# Patient Record
Sex: FEMALE | Race: WHITE | Employment: OTHER | URBAN - METROPOLITAN AREA
[De-identification: names, ages, dates, MRNs, and addresses within clinical notes are randomized per-mention and may not be internally consistent; named-entity substitution may affect disease eponyms.]

---

## 2022-01-12 ENCOUNTER — OFFICE VISIT (OUTPATIENT)
Dept: OBGYN CLINIC | Age: 52
End: 2022-01-12
Payer: COMMERCIAL

## 2022-01-12 VITALS
WEIGHT: 161 LBS | SYSTOLIC BLOOD PRESSURE: 120 MMHG | HEIGHT: 69 IN | BODY MASS INDEX: 23.85 KG/M2 | DIASTOLIC BLOOD PRESSURE: 72 MMHG

## 2022-01-12 DIAGNOSIS — R87.610 ASCUS OF CERVIX WITH NEGATIVE HIGH RISK HPV: Primary | ICD-10-CM

## 2022-01-12 PROCEDURE — 99202 OFFICE O/P NEW SF 15 MIN: CPT | Performed by: OBSTETRICS & GYNECOLOGY

## 2023-03-13 ENCOUNTER — OFFICE VISIT (OUTPATIENT)
Dept: OBGYN CLINIC | Age: 53
End: 2023-03-13
Payer: COMMERCIAL

## 2023-03-13 VITALS
BODY MASS INDEX: 23.55 KG/M2 | WEIGHT: 159 LBS | HEIGHT: 69 IN | DIASTOLIC BLOOD PRESSURE: 78 MMHG | SYSTOLIC BLOOD PRESSURE: 138 MMHG

## 2023-03-13 DIAGNOSIS — Z01.419 ENCOUNTER FOR WELL WOMAN EXAM WITH ROUTINE GYNECOLOGICAL EXAM: Primary | ICD-10-CM

## 2023-03-13 DIAGNOSIS — Z12.31 SCREENING MAMMOGRAM FOR BREAST CANCER: ICD-10-CM

## 2023-03-13 PROCEDURE — 99396 PREV VISIT EST AGE 40-64: CPT | Performed by: OBSTETRICS & GYNECOLOGY

## 2023-03-13 RX ORDER — VITAMIN B COMPLEX
CAPSULE ORAL DAILY
COMMUNITY

## 2023-03-13 RX ORDER — MULTIVIT-MIN/IRON/FOLIC ACID/K 18-600-40
CAPSULE ORAL
COMMUNITY

## 2023-03-13 SDOH — ECONOMIC STABILITY: INCOME INSECURITY: HOW HARD IS IT FOR YOU TO PAY FOR THE VERY BASICS LIKE FOOD, HOUSING, MEDICAL CARE, AND HEATING?: NOT HARD AT ALL

## 2023-03-13 SDOH — ECONOMIC STABILITY: FOOD INSECURITY: WITHIN THE PAST 12 MONTHS, YOU WORRIED THAT YOUR FOOD WOULD RUN OUT BEFORE YOU GOT MONEY TO BUY MORE.: NEVER TRUE

## 2023-03-13 SDOH — ECONOMIC STABILITY: HOUSING INSECURITY
IN THE LAST 12 MONTHS, WAS THERE A TIME WHEN YOU DID NOT HAVE A STEADY PLACE TO SLEEP OR SLEPT IN A SHELTER (INCLUDING NOW)?: NO

## 2023-03-13 SDOH — ECONOMIC STABILITY: FOOD INSECURITY: WITHIN THE PAST 12 MONTHS, THE FOOD YOU BOUGHT JUST DIDN'T LAST AND YOU DIDN'T HAVE MONEY TO GET MORE.: NEVER TRUE

## 2023-03-13 ASSESSMENT — PATIENT HEALTH QUESTIONNAIRE - PHQ9
2. FEELING DOWN, DEPRESSED OR HOPELESS: 0
1. LITTLE INTEREST OR PLEASURE IN DOING THINGS: 0
SUM OF ALL RESPONSES TO PHQ QUESTIONS 1-9: 0
SUM OF ALL RESPONSES TO PHQ QUESTIONS 1-9: 0
SUM OF ALL RESPONSES TO PHQ9 QUESTIONS 1 & 2: 0
SUM OF ALL RESPONSES TO PHQ QUESTIONS 1-9: 0
SUM OF ALL RESPONSES TO PHQ QUESTIONS 1-9: 0

## 2023-03-13 ASSESSMENT — ENCOUNTER SYMPTOMS
BLOOD IN STOOL: 0
DIARRHEA: 0
RESPIRATORY NEGATIVE: 1
ANAL BLEEDING: 0
ABDOMINAL PAIN: 0
ABDOMINAL DISTENTION: 0
NAUSEA: 0
VOMITING: 0
CONSTIPATION: 0
RECTAL PAIN: 0
ALLERGIC/IMMUNOLOGIC NEGATIVE: 1
EYES NEGATIVE: 1

## 2023-03-13 ASSESSMENT — VISUAL ACUITY: OU: 1

## 2023-03-13 NOTE — PROGRESS NOTES
The patient was asked if she would like a chaperone present for her intimate exam. She  Declined the chaperone.  Garrick Mesa CMA ([address] [place] [address])

## 2023-03-13 NOTE — PROGRESS NOTES
Subjective:      Patient ID: Tanya Talley is a 47 y.o. female    Annual exam. Reviewed medical, surgical, social and family history. Also reviewed current medications and allergies. No PMB. No GYN complaints. Pap deferred ( ASCUS/neg HPV 2022; next pap 2025 ). Screening mammogram ordered. Monthly SBE encouraged. Screening colonoscopy recommended per routine. F/U annual exam or prn. Vitals:  /78   Ht 5' 9\" (1.753 m)   Wt 159 lb (72.1 kg)   BMI 23.48 kg/m²   Past Medical History:   Diagnosis Date    Abnormal Pap smear of cervix 11/18/2021    ASCUS     Hernia, inguinal, left      Past Surgical History:   Procedure Laterality Date    FOOT SURGERY      B/L     Allergies:  Sulfa antibiotics  Current Outpatient Medications   Medication Sig Dispense Refill    b complex vitamins capsule Take by mouth daily      Cholecalciferol (VITAMIN D) 50 MCG (2000 UT) CAPS capsule Take by mouth      Misc Natural Products (OSTEO BI-FLEX/5-LOXIN ADVANCED PO) Take by mouth       No current facility-administered medications for this visit. Social History     Socioeconomic History    Marital status:      Spouse name: Not on file    Number of children: Not on file    Years of education: Not on file    Highest education level: Not on file   Occupational History    Not on file   Tobacco Use    Smoking status: Former    Smokeless tobacco: Never   Substance and Sexual Activity    Alcohol use: Yes    Drug use: Not on file    Sexual activity: Yes     Partners: Male   Other Topics Concern    Not on file   Social History Narrative    Not on file     Social Determinants of Health     Financial Resource Strain: Low Risk     Difficulty of Paying Living Expenses: Not hard at all   Food Insecurity: No Food Insecurity    Worried About Running Out of Food in the Last Year: Never true    [address] in the Last Year: Never true   Transportation Needs: Unknown    Lack of Transportation (Medical):  Not on file    Lack of Transportation (Non-Medical): No   Physical Activity: Not on file   Stress: Not on file   Social Connections: Not on file   Intimate Partner Violence: Not on file   Housing Stability: Unknown    Unable to Pay for Housing in the Last Year: Not on file    Number of Places Lived in the Last Year: Not on file    Unstable Housing in the Last Year: No     Family History   Problem Relation Age of Onset    Parkinson's Disease Father 68    Breast Cancer Neg Hx     Cancer Neg Hx     Colon Cancer Neg Hx     Diabetes Neg Hx     Eclampsia Neg Hx     Hypertension Neg Hx     Ovarian Cancer Neg Hx      Labor Neg Hx     Spont Abortions Neg Hx     Stroke Neg Hx        Review of Systems   Constitutional: Negative. Negative for activity change, appetite change, chills, diaphoresis, fatigue, fever and unexpected weight change. HENT: Negative. Eyes: Negative. Respiratory: Negative. Cardiovascular: Negative. Gastrointestinal:  Negative for abdominal distention, abdominal pain, anal bleeding, blood in stool, constipation, diarrhea, nausea, rectal pain and vomiting. Endocrine: Negative. Genitourinary:  Negative for decreased urine volume, difficulty urinating, dyspareunia, dysuria, enuresis, flank pain, frequency, genital sores, hematuria, menstrual problem, pelvic pain, urgency, vaginal bleeding, vaginal discharge and vaginal pain. Musculoskeletal: Negative. Skin: Negative. Allergic/Immunologic: Negative. Neurological: Negative. Hematological: Negative. Psychiatric/Behavioral: Negative. Objective:     Physical Exam  Constitutional:       Appearance: She is well-developed. HENT:      Head: Normocephalic. Eyes:      General: Lids are normal. Vision grossly intact. Neck:      Thyroid: No thyromegaly. Cardiovascular:      Rate and Rhythm: Normal rate and regular rhythm. Heart sounds: Normal heart sounds.    Pulmonary:      Effort: Pulmonary effort is normal. No respiratory distress. Breath sounds: Normal breath sounds. No wheezing or rales. Chest:      Chest wall: No tenderness. Breasts:     Right: Normal. No swelling, bleeding, inverted nipple, mass, nipple discharge, skin change or tenderness. Left: Normal. No swelling, bleeding, inverted nipple, mass, nipple discharge, skin change or tenderness. Abdominal:      General: There is no distension. Palpations: Abdomen is soft. There is no mass. Tenderness: There is no abdominal tenderness. There is no guarding or rebound. Hernia: No hernia is present. There is no hernia in the left inguinal area or right inguinal area. Genitourinary:     General: Normal vulva. Pubic Area: No rash. Labia:         Right: No rash, tenderness, lesion or injury. Left: No rash, tenderness, lesion or injury. Urethra: No prolapse, urethral swelling or urethral lesion. Vagina: Normal. No signs of injury and foreign body. No vaginal discharge, erythema, tenderness or bleeding. Cervix: No cervical motion tenderness, discharge or friability. Uterus: Not deviated, not enlarged, not fixed and not tender. Adnexa:         Right: No mass, tenderness or fullness. Left: No mass, tenderness or fullness. Rectum: Normal.   Musculoskeletal:         General: No tenderness. Normal range of motion. Cervical back: Normal range of motion and neck supple. Lymphadenopathy:      Cervical: No cervical adenopathy. Upper Body:      Right upper body: No supraclavicular or axillary adenopathy. Left upper body: No supraclavicular or axillary adenopathy. Lower Body: No right inguinal adenopathy. No left inguinal adenopathy. Skin:     General: Skin is warm and dry. Coloration: Skin is not pale. Findings: No erythema or rash. Neurological:      Mental Status: She is alert and oriented to person, place, and time.    Psychiatric:         Behavior: Behavior normal. Thought Content: Thought content normal.         Judgment: Judgment normal.       Assessment:       Diagnosis Orders   1. Encounter for well woman exam with routine gynecological exam        2. Screening mammogram for breast cancer  [facility]WAN DIGITAL SCREEN W OR WO CAD BILATERAL           Plan:      Medications placedthis encounter:  No orders of the defined types were placed in this encounter. Orders placedthis encounter:  Orders Placed This Encounter   Procedures    [facility]WAN DIGITAL SCREEN W OR WO CAD BILATERAL     Standing Status:   Future     Standing Expiration Date:   3/12/2024         Follow up:  Return in about 1 year (around 3/13/2024) for Annual.  Repeat Annual GYN exam every 1 year. Cervical Cytology exam starts age 24. If Negative Cytology;  follow-up screening per current guidelines. Mammograms yearly starting at age 36. Calcium and Vitamin D dosing reviewed ( age appropriate ). Colonoscopy and bone density screening discussed ( age appropriate ). Birth control and STD prevention discussed ( age appropriate ). Gardisil counseling completed for all patients ( age appropriate ). Routine health maintenance ( per PCP and guidelines ).

## 2023-03-17 DIAGNOSIS — Z12.31 SCREENING MAMMOGRAM FOR BREAST CANCER: ICD-10-CM

## 2023-04-03 LAB
ALANINE AMINOTRANSFERASE (SGPT) (U/L) IN SER/PLAS: 16 U/L (ref 7–45)
ALBUMIN (G/DL) IN SER/PLAS: 4.4 G/DL (ref 3.4–5)
ALKALINE PHOSPHATASE (U/L) IN SER/PLAS: 69 U/L (ref 33–110)
ANION GAP IN SER/PLAS: 13 MMOL/L (ref 10–20)
ASPARTATE AMINOTRANSFERASE (SGOT) (U/L) IN SER/PLAS: 19 U/L (ref 9–39)
BILIRUBIN TOTAL (MG/DL) IN SER/PLAS: 1 MG/DL (ref 0–1.2)
CALCIDIOL (25 OH VITAMIN D3) (NG/ML) IN SER/PLAS: 30 NG/ML
CALCIUM (MG/DL) IN SER/PLAS: 9.4 MG/DL (ref 8.6–10.3)
CARBON DIOXIDE, TOTAL (MMOL/L) IN SER/PLAS: 28 MMOL/L (ref 21–32)
CHLORIDE (MMOL/L) IN SER/PLAS: 104 MMOL/L (ref 98–107)
CHOLESTEROL (MG/DL) IN SER/PLAS: 190 MG/DL (ref 0–199)
CHOLESTEROL IN HDL (MG/DL) IN SER/PLAS: 65.8 MG/DL
CHOLESTEROL/HDL RATIO: 2.9
CREATININE (MG/DL) IN SER/PLAS: 0.65 MG/DL (ref 0.5–1.05)
ERYTHROCYTE DISTRIBUTION WIDTH (RATIO) BY AUTOMATED COUNT: 11.9 % (ref 11.5–14.5)
ERYTHROCYTE MEAN CORPUSCULAR HEMOGLOBIN CONCENTRATION (G/DL) BY AUTOMATED: 33.6 G/DL (ref 32–36)
ERYTHROCYTE MEAN CORPUSCULAR VOLUME (FL) BY AUTOMATED COUNT: 93 FL (ref 80–100)
ERYTHROCYTES (10*6/UL) IN BLOOD BY AUTOMATED COUNT: 4.45 X10E12/L (ref 4–5.2)
GFR FEMALE: >90 ML/MIN/1.73M2
GLUCOSE (MG/DL) IN SER/PLAS: 85 MG/DL (ref 74–99)
HEMATOCRIT (%) IN BLOOD BY AUTOMATED COUNT: 41.4 % (ref 36–46)
HEMOGLOBIN (G/DL) IN BLOOD: 13.9 G/DL (ref 12–16)
LDL: 101 MG/DL (ref 0–99)
LEUKOCYTES (10*3/UL) IN BLOOD BY AUTOMATED COUNT: 5.7 X10E9/L (ref 4.4–11.3)
PLATELETS (10*3/UL) IN BLOOD AUTOMATED COUNT: 187 X10E9/L (ref 150–450)
POTASSIUM (MMOL/L) IN SER/PLAS: 3.9 MMOL/L (ref 3.5–5.3)
PROTEIN TOTAL: 6.7 G/DL (ref 6.4–8.2)
RHEUMATOID FACTOR (IU/ML) IN SERUM OR PLASMA: <10 IU/ML (ref 0–15)
SODIUM (MMOL/L) IN SER/PLAS: 141 MMOL/L (ref 136–145)
THYROTROPIN (MIU/L) IN SER/PLAS BY DETECTION LIMIT <= 0.05 MIU/L: 4.29 MIU/L (ref 0.44–3.98)
TRIGLYCERIDE (MG/DL) IN SER/PLAS: 117 MG/DL (ref 0–149)
UREA NITROGEN (MG/DL) IN SER/PLAS: 22 MG/DL (ref 6–23)
VLDL: 23 MG/DL (ref 0–40)

## 2023-04-06 LAB
DILUTE RUSSELL VIPER VENOM TIME CONF: 1.02 RATIO
DILUTE RUSSELL VIPER VENOM TIME SCREEN: 0.94 RATIO
DILUTE RUSSELL VIPER VENOM TIME TEST RATIO: 0.92 RATIO
LUPUS ANTICOAGULANT INTERPRETATION: NORMAL
NORMALIZED SILICA CLOTTING TIME (RATIO) OF PPP: 0.79 RATIO
SILICA CLOTTING TIME CONFIRMATION: 0.99 RATIO
SILICA CLOTTING TIME SCREEN: 0.78 RATIO

## 2023-08-24 DIAGNOSIS — F41.8 SITUATIONAL ANXIETY: Primary | ICD-10-CM

## 2023-08-24 RX ORDER — ALPRAZOLAM 0.25 MG/1
0.25 TABLET ORAL 2 TIMES DAILY PRN
Qty: 20 TABLET | Refills: 0 | Status: SHIPPED | OUTPATIENT
Start: 2023-08-24 | End: 2023-09-23

## 2023-09-15 DIAGNOSIS — F41.8 SITUATIONAL ANXIETY: ICD-10-CM

## 2023-09-15 RX ORDER — ALPRAZOLAM 0.25 MG/1
0.25 TABLET ORAL 2 TIMES DAILY PRN
Qty: 20 TABLET | Refills: 1 | Status: SHIPPED | OUTPATIENT
Start: 2023-09-15 | End: 2023-10-15

## 2023-10-23 ENCOUNTER — HOSPITAL ENCOUNTER (EMERGENCY)
Facility: HOSPITAL | Age: 53
Discharge: HOME | End: 2023-10-23
Attending: EMERGENCY MEDICINE
Payer: COMMERCIAL

## 2023-10-23 ENCOUNTER — APPOINTMENT (OUTPATIENT)
Dept: RADIOLOGY | Facility: HOSPITAL | Age: 53
End: 2023-10-23
Payer: COMMERCIAL

## 2023-10-23 VITALS
BODY MASS INDEX: 21.92 KG/M2 | SYSTOLIC BLOOD PRESSURE: 129 MMHG | TEMPERATURE: 97.2 F | WEIGHT: 148 LBS | RESPIRATION RATE: 18 BRPM | OXYGEN SATURATION: 98 % | HEIGHT: 69 IN | HEART RATE: 62 BPM | DIASTOLIC BLOOD PRESSURE: 77 MMHG

## 2023-10-23 DIAGNOSIS — R07.9 CHEST PAIN, UNSPECIFIED TYPE: Primary | ICD-10-CM

## 2023-10-23 LAB
ALBUMIN SERPL BCP-MCNC: 4.6 G/DL (ref 3.4–5)
ALP SERPL-CCNC: 85 U/L (ref 33–110)
ALT SERPL W P-5'-P-CCNC: 46 U/L (ref 7–45)
ANION GAP SERPL CALC-SCNC: 11 MMOL/L (ref 10–20)
AST SERPL W P-5'-P-CCNC: 27 U/L (ref 9–39)
BASOPHILS # BLD AUTO: 0.06 X10*3/UL (ref 0–0.1)
BASOPHILS NFR BLD AUTO: 0.6 %
BILIRUB SERPL-MCNC: 0.6 MG/DL (ref 0–1.2)
BNP SERPL-MCNC: 32 PG/ML (ref 0–99)
BUN SERPL-MCNC: 17 MG/DL (ref 6–23)
CALCIUM SERPL-MCNC: 10.4 MG/DL (ref 8.6–10.3)
CARDIAC TROPONIN I PNL SERPL HS: <3 NG/L (ref 0–13)
CARDIAC TROPONIN I PNL SERPL HS: <3 NG/L (ref 0–13)
CHLORIDE SERPL-SCNC: 103 MMOL/L (ref 98–107)
CO2 SERPL-SCNC: 29 MMOL/L (ref 21–32)
CREAT SERPL-MCNC: 0.72 MG/DL (ref 0.5–1.05)
EOSINOPHIL # BLD AUTO: 0.12 X10*3/UL (ref 0–0.7)
EOSINOPHIL NFR BLD AUTO: 1.2 %
ERYTHROCYTE [DISTWIDTH] IN BLOOD BY AUTOMATED COUNT: 12.1 % (ref 11.5–14.5)
GFR SERPL CREATININE-BSD FRML MDRD: >90 ML/MIN/1.73M*2
GLUCOSE SERPL-MCNC: 124 MG/DL (ref 74–99)
HCT VFR BLD AUTO: 42.3 % (ref 36–46)
HGB BLD-MCNC: 14.6 G/DL (ref 12–16)
HOLD SPECIMEN: NORMAL
IMM GRANULOCYTES # BLD AUTO: 0.02 X10*3/UL (ref 0–0.7)
IMM GRANULOCYTES NFR BLD AUTO: 0.2 % (ref 0–0.9)
LYMPHOCYTES # BLD AUTO: 2.94 X10*3/UL (ref 1.2–4.8)
LYMPHOCYTES NFR BLD AUTO: 29.1 %
MAGNESIUM SERPL-MCNC: 2.09 MG/DL (ref 1.6–2.4)
MCH RBC QN AUTO: 31.9 PG (ref 26–34)
MCHC RBC AUTO-ENTMCNC: 34.5 G/DL (ref 32–36)
MCV RBC AUTO: 92 FL (ref 80–100)
MONOCYTES # BLD AUTO: 0.46 X10*3/UL (ref 0.1–1)
MONOCYTES NFR BLD AUTO: 4.6 %
NEUTROPHILS # BLD AUTO: 6.49 X10*3/UL (ref 1.2–7.7)
NEUTROPHILS NFR BLD AUTO: 64.3 %
NRBC BLD-RTO: 0 /100 WBCS (ref 0–0)
PLATELET # BLD AUTO: 201 X10*3/UL (ref 150–450)
PMV BLD AUTO: 11 FL (ref 7.5–11.5)
POTASSIUM SERPL-SCNC: 3.8 MMOL/L (ref 3.5–5.3)
PROT SERPL-MCNC: 7.5 G/DL (ref 6.4–8.2)
RBC # BLD AUTO: 4.58 X10*6/UL (ref 4–5.2)
SODIUM SERPL-SCNC: 139 MMOL/L (ref 136–145)
WBC # BLD AUTO: 10.1 X10*3/UL (ref 4.4–11.3)

## 2023-10-23 PROCEDURE — 36415 COLL VENOUS BLD VENIPUNCTURE: CPT | Performed by: EMERGENCY MEDICINE

## 2023-10-23 PROCEDURE — 85025 COMPLETE CBC W/AUTO DIFF WBC: CPT | Performed by: EMERGENCY MEDICINE

## 2023-10-23 PROCEDURE — 84484 ASSAY OF TROPONIN QUANT: CPT | Performed by: EMERGENCY MEDICINE

## 2023-10-23 PROCEDURE — 83880 ASSAY OF NATRIURETIC PEPTIDE: CPT | Performed by: EMERGENCY MEDICINE

## 2023-10-23 PROCEDURE — 71045 X-RAY EXAM CHEST 1 VIEW: CPT | Performed by: RADIOLOGY

## 2023-10-23 PROCEDURE — 99284 EMERGENCY DEPT VISIT MOD MDM: CPT | Performed by: EMERGENCY MEDICINE

## 2023-10-23 PROCEDURE — 71045 X-RAY EXAM CHEST 1 VIEW: CPT | Mod: FY

## 2023-10-23 PROCEDURE — 80053 COMPREHEN METABOLIC PANEL: CPT | Performed by: EMERGENCY MEDICINE

## 2023-10-23 PROCEDURE — 99283 EMERGENCY DEPT VISIT LOW MDM: CPT | Mod: 25

## 2023-10-23 PROCEDURE — 83735 ASSAY OF MAGNESIUM: CPT | Performed by: EMERGENCY MEDICINE

## 2023-10-23 RX ORDER — SODIUM CHLORIDE 9 MG/ML
3 INJECTION, SOLUTION INTRAMUSCULAR; INTRAVENOUS; SUBCUTANEOUS AS NEEDED
Status: DISCONTINUED | OUTPATIENT
Start: 2023-10-23 | End: 2023-10-23 | Stop reason: HOSPADM

## 2023-10-23 RX ORDER — SODIUM CHLORIDE 9 MG/ML
100 INJECTION, SOLUTION INTRAVENOUS CONTINUOUS
Status: DISCONTINUED | OUTPATIENT
Start: 2023-10-23 | End: 2023-10-23 | Stop reason: HOSPADM

## 2023-10-23 ASSESSMENT — HEART SCORE
TROPONIN: LESS THAN OR EQUAL TO NORMAL LIMIT
HEART SCORE: 1
AGE: 45-64
RISK FACTORS: NO KNOWN RISK FACTORS
ECG: NORMAL
HISTORY: SLIGHTLY SUSPICIOUS

## 2023-10-23 ASSESSMENT — COLUMBIA-SUICIDE SEVERITY RATING SCALE - C-SSRS
6. HAVE YOU EVER DONE ANYTHING, STARTED TO DO ANYTHING, OR PREPARED TO DO ANYTHING TO END YOUR LIFE?: NO
2. HAVE YOU ACTUALLY HAD ANY THOUGHTS OF KILLING YOURSELF?: NO
1. IN THE PAST MONTH, HAVE YOU WISHED YOU WERE DEAD OR WISHED YOU COULD GO TO SLEEP AND NOT WAKE UP?: NO

## 2023-10-23 ASSESSMENT — PAIN - FUNCTIONAL ASSESSMENT: PAIN_FUNCTIONAL_ASSESSMENT: 0-10

## 2023-10-23 ASSESSMENT — PAIN SCALES - GENERAL: PAINLEVEL_OUTOF10: 2

## 2023-10-23 NOTE — ED PROVIDER NOTES
HPI   Chief Complaint   Patient presents with    Chest Pain     Chest pain for 1 week       History of Present Illness:  53-year-old female presents with 2-week history of intermittent chest pain.  She describes it as a squeezing pain in her mid chest.  She believes it may be related to anxiety as she has been going through a lot of stress.  No fever, chills or cough.  No shortness of breath.  No back or flank pain.  No leg swelling.  No treatment prior to arrival.  No trauma or travel.  No sick contacts.  Patient does share that she has a history of hypertension recently restarted on her own her metoprolol.  She also states that she had quit smoking but will on occasion smoke a cigarette.      PMFSH:   As per HPI, otherwise nurses notes reviewed in EMR.    Past Medical History: Active Ambulatory Problems  No Active Ambulatory Problems  Resolved Ambulatory Problems  No Resolved Ambulatory Problems  Past Medical History:  No date: Body mass index (BMI) 29.0-29.9, adult  No date: Elevated blood-pressure reading, without diagnosis of   hypertension  No date: Encounter for immunization  No date: Encounter for screening for malignant neoplasm of colon  10/29/2021: Encounter for screening for malignant neoplasm of colon  No date: Overweight  10/29/2021: Personal history of other endocrine, nutritional and   metabolic disease  10/29/2021: Personal history of other medical treatment  No date: Personal history of other specified conditions  No date: Solitary pulmonary nodule   Past Surgical History: Past Surgical History:  01/27/2020: OTHER SURGICAL HISTORY      Comment:  Foot surgery   Family History: No family history on file.     Social History:  Social History    Socioeconomic History      Marital status:       Spouse name: Not on file      Number of children: Not on file      Years of education: Not on file      Highest education level: Not on file    Occupational History      Not on file    Tobacco Use       Smoking status: Not on file      Smokeless tobacco: Not on file    Substance and Sexual Activity      Alcohol use: Not on file      Drug use: Not on file      Sexual activity: Not on file    Other Topics      Concerns:        Not on file    Social History Narrative      Not on file    Social Determinants of Health  Financial Resource Strain: Not on file  Food Insecurity: Not on file  Transportation Needs: Not on file  Physical Activity: Not on file  Stress: Not on file  Social Connections: Not on file  Intimate Partner Violence: Not on file  Housing Stability: Not on file         History provided by:  Patient                      No data recorded HEART Score: 1                Patient History   Past Medical History:   Diagnosis Date    Body mass index (BMI) 29.0-29.9, adult     BMI 29.0-29.9,adult    Elevated blood-pressure reading, without diagnosis of hypertension     Elevated BP without diagnosis of hypertension    Encounter for immunization     Encounter for immunization    Encounter for screening for malignant neoplasm of colon     Encounter for screening fecal occult blood testing    Encounter for screening for malignant neoplasm of colon 10/29/2021    Encounter for screening colonoscopy    Overweight     Overweight    Personal history of other endocrine, nutritional and metabolic disease 10/29/2021    History of hypothyroidism    Personal history of other medical treatment 10/29/2021    History of screening mammography    Personal history of other specified conditions     History of headache    Solitary pulmonary nodule     Lung nodule     Past Surgical History:   Procedure Laterality Date    OTHER SURGICAL HISTORY  01/27/2020    Foot surgery     No family history on file.  Social History     Tobacco Use    Smoking status: Not on file    Smokeless tobacco: Not on file   Substance Use Topics    Alcohol use: Not on file    Drug use: Not on file       Physical Exam   ED Triage Vitals [10/23/23 1306]   Temp Heart  Rate Resp BP   36.2 °C (97.2 °F) 64 16 135/82      SpO2 Temp Source Heart Rate Source Patient Position   98 % Temporal Monitor --      BP Location FiO2 (%)     -- --       Physical Exam  Physical Exam:    ED Triage Vitals [10/23/23 1306]   Temp Heart Rate Resp BP   36.2 °C (97.2 °F) 64 16 135/82      SpO2 Temp Source Heart Rate Source Patient Position   98 % Temporal Monitor --      BP Location FiO2 (%)     -- --         Constitutional: Vital signs per nursing notes.  Well developed, well nourished.  No acute distress.    Psychiatric: alert and oriented to person, place, and time; no abnormalities of mood or affect; memory intact  Eyes: PERRL; conjunctivae and lids normal; EOMI  ENT: otoscopic exam of external canal and TM´s normal; nasal mucosa, turbinates, and septum normal; mouth, tongue, and pharynx normal; pharynx without edema, exudate, or injection  Respiratory: normal respiratory effort and excursion; no rales, rhonchi, or wheezes; equal air entry  Cardiovascular: regular rate and rhythm; no murmurs, rubs or gallops; symmetric pulses; no edema; normal capillary refill; distal pulses present  Neurological: normal speech; CN II-XII grossly intact; normal motor and sensory function; no nystagmus; no pronator drift  GI: no masses, tenderness, rebound or guarding; no palpable, pulsatile mass; no organomegaly; no hernia; normal bowel sounds; (-) Archibald´s sign; (-) McBurney´s sign; (-) CVA tenderness  Lymphatic: no adenopathy of neck  Musculoskeletal: normal gait and station; normal digits and nails; no gross tendon or ligament injury; normal to palpation; normal strength/tone; neurovascular status intact; (-) Petty´s sign  Skin: normal to inspection; normal to palpation; no rash  GCS: 15    ED Course & MDM   Diagnoses as of 10/23/23 1549   Chest pain, unspecified type       Medical Decision Making  Medical Decision Making:    Differential Diagnoses Considered: ACS, arrhythmia, pleurisy, PE, pneumonia, muscle  strain     EKG: My interpretation of EKG is normal sinus rhythm at 64 bpm with no acute ST-T changes    Labs Reviewed   COMPREHENSIVE METABOLIC PANEL - Abnormal       Result Value    Glucose 124 (*)     Sodium 139      Potassium 3.8      Chloride 103      Bicarbonate 29      Anion Gap 11      Urea Nitrogen 17      Creatinine 0.72      eGFR >90      Calcium 10.4 (*)     Albumin 4.6      Alkaline Phosphatase 85      Total Protein 7.5      AST 27      Bilirubin, Total 0.6      ALT 46 (*)    MAGNESIUM - Normal    Magnesium 2.09     B-TYPE NATRIURETIC PEPTIDE - Normal    BNP 32      Narrative:        <100 pg/mL - Heart failure unlikely  100-299 pg/mL - Intermediate probability of acute heart                  failure exacerbation. Correlate with clinical                  context and patient history.    >=300 pg/mL - Heart Failure likely. Correlate with clinical                  context and patient history.    BNP testing is performed using different testing methodology at [facility] ([facility]3] than at other [facility]. Direct result comparisons should only be made within the same method.      SERIAL TROPONIN-INITIAL - Normal    Troponin I, High Sensitivity <3      Narrative:     Less than 99th percentile of normal range cutoff-  Female and children under 18 years old <14 ng/L; Male <21 ng/L: Negative  Repeat testing should be performed if clinically indicated.     Female and children under 18 years old 14-50 ng/L; Male 21-50 ng/L:  Consistent with possible cardiac damage and possible increased clinical   risk. Serial measurements may help to assess extent of myocardial damage.     >50 ng/L: Consistent with cardiac damage, increased clinical risk and  myocardial infarction. Serial measurements may help assess extent of   myocardial damage.      NOTE: Children less than 1 year old may have higher baseline troponin   levels and results should be interpreted in conjunction with the overall   clinical context.      NOTE: Troponin I testing is performed using a different   testing methodology at [facility] than at other   [facility]. Direct result comparisons should only   be made within the same method.   SERIAL TROPONIN, 1 HOUR - Normal    Troponin I, High Sensitivity <3      Narrative:     Less than 99th percentile of normal range cutoff-  Female and children under 18 years old <14 ng/L; Male <21 ng/L: Negative  Repeat testing should be performed if clinically indicated.     Female and children under 18 years old 14-50 ng/L; Male 21-50 ng/L:  Consistent with possible cardiac damage and possible increased clinical   risk. Serial measurements may help to assess extent of myocardial damage.     >50 ng/L: Consistent with cardiac damage, increased clinical risk and  myocardial infarction. Serial measurements may help assess extent of   myocardial damage.      NOTE: Children less than 1 year old may have higher baseline troponin   levels and results should be interpreted in conjunction with the overall   clinical context.     NOTE: Troponin I testing is performed using a different   testing methodology at [facility] than at other   [facility]. Direct result comparisons should only   be made within the same method.   TROPONIN SERIES- (INITIAL, 1 HR)    Narrative:     The following orders were created for panel order Troponin I Series, High Sensitivity (0, 1 HR).  Procedure                               Abnormality         Status                     ---------                               -----------         ------                     Troponin I, High Sensiti...[[id number]]  Normal              Final result               Troponin, High Sensitivi...[[id number]]  Normal              Final result                 Please view results for these tests on the individual orders.   CBC WITH AUTO DIFFERENTIAL    WBC 10.1      nRBC 0.0      RBC 4.58      Hemoglobin 14.6      Hematocrit 42.3      MCV 92       MCH 31.9      MCHC 34.5      RDW 12.1      Platelets 201      MPV 11.0      Neutrophils % 64.3      Immature Granulocytes %, Automated 0.2      Lymphocytes % 29.1      Monocytes % 4.6      Eosinophils % 1.2      Basophils % 0.6      Neutrophils Absolute 6.49      Immature Granulocytes Absolute, Automated 0.02      Lymphocytes Absolute 2.94      Monocytes Absolute 0.46      Eosinophils Absolute 0.12      Basophils Absolute 0.06     GRAY TOP       XR chest 1 view   Final Result   1.  No evidence of acute cardiopulmonary process.             Signed by: Srinath Betancur 10/23/2023 2:30 PM   Dictation workstation:   [id number]          Diagnostic testing considered:         Review of recent and relevant records:    ED Medication Administration:   ED Medication Administration from 10/23/2023 1245 to 10/23/2023 1549         Date/Time Order Dose Route Action Action by     10/23/2023 1355 EDT sodium chloride 0.9% infusion 100 mL/hr intravenous Not Given MELL Barbour            Prescription Medication Consideration/Given:     Social Determinants of Health Significantly Affecting Care:      Summary:    BP      Temp      Pulse     Resp      SpO2        Abnormal Labs Reviewed   COMPREHENSIVE METABOLIC PANEL - Abnormal; Notable for the following components:       Result Value    Glucose 124 (*)     Calcium 10.4 (*)     ALT 46 (*)     All other components within normal limits       Diagnoses as of 10/23/23 1549   Chest pain, unspecified type     Diagnostic evaluation was completed.  Metabolic panel was unremarkable including a normal sodium potassium level.  Renal and liver function was also in the normal range.  2 sets of high-sensitivity troponin were negative.  Therefore I do not suspect acute coronary syndrome.  BNP was in the normal range.  Therefore I do not suspect congestive heart failure.  CBC shows a normal white blood cell count so I do not suspect an acute infectious etiology.  Hemoglobin is 14.6 so there is no anemia  and I do not suspect acute blood loss.  Chest x-ray shows no evidence of acute cardiopulmonary process.    The exact etiology of the patient's chest pain is unclear at this time.  However no urgent or emergent conditions of been identified.  The patient be discharged home with recommended short-term follow-up with the cardiologist.  The patient may require additional work-up and evaluation as an outpatient including possible stress test and echocardiogram.    I discussed the results and discharge plan with the patient and/or family/friend if present.  I emphasized the importance of follow up with the physician I referred them to in the timeframe recommended.  I explained reasons for the patient to return to the Emergency Department.  Questions were addressed.  The patient and/or family/friend expressed understanding.        Procedure  Procedures     Santana BOATENG MD  10/23/23 2333

## 2023-11-09 ENCOUNTER — HOSPITAL ENCOUNTER (OUTPATIENT)
Facility: HOSPITAL | Age: 53
Setting detail: OUTPATIENT SURGERY
End: 2023-11-09
Attending: ORTHOPAEDIC SURGERY | Admitting: ORTHOPAEDIC SURGERY
Payer: COMMERCIAL

## 2023-11-09 ENCOUNTER — OFFICE VISIT (OUTPATIENT)
Dept: ORTHOPEDIC SURGERY | Facility: CLINIC | Age: 53
End: 2023-11-09
Payer: COMMERCIAL

## 2023-11-09 DIAGNOSIS — G56.03 BILATERAL CARPAL TUNNEL SYNDROME: Primary | ICD-10-CM

## 2023-11-09 PROBLEM — R87.629 ABNORMAL VAGINAL PAP SMEAR: Status: ACTIVE | Noted: 2023-11-09

## 2023-11-09 PROBLEM — E55.9 VITAMIN D DEFICIENCY: Status: ACTIVE | Noted: 2023-11-09

## 2023-11-09 PROBLEM — G56.01 CARPAL TUNNEL SYNDROME, RIGHT UPPER LIMB: Status: ACTIVE | Noted: 2023-11-09

## 2023-11-09 PROBLEM — F17.200 CURRENT EVERY DAY SMOKER: Status: ACTIVE | Noted: 2023-11-09

## 2023-11-09 PROBLEM — K58.9 IRRITABLE BOWEL SYNDROME (IBS): Status: ACTIVE | Noted: 2023-11-09

## 2023-11-09 PROBLEM — K41.90 FEMORAL HERNIA OF LEFT SIDE: Status: ACTIVE | Noted: 2023-11-09

## 2023-11-09 PROBLEM — N76.0 VAGINITIS: Status: ACTIVE | Noted: 2023-11-09

## 2023-11-09 PROBLEM — K40.90 INGUINAL HERNIA, LEFT: Status: ACTIVE | Noted: 2023-11-09

## 2023-11-09 PROCEDURE — 99214 OFFICE O/P EST MOD 30 MIN: CPT | Performed by: ORTHOPAEDIC SURGERY

## 2023-11-09 RX ORDER — VITAMIN B COMPLEX
CAPSULE ORAL DAILY
COMMUNITY

## 2023-11-09 RX ORDER — MULTIVIT WITH IRON,MINERALS
TABLET,CHEWABLE ORAL
COMMUNITY
End: 2023-12-19 | Stop reason: WASHOUT

## 2023-11-09 RX ORDER — CHOLECALCIFEROL (VITAMIN D3) 25 MCG
1 TABLET ORAL DAILY
COMMUNITY
End: 2023-12-19 | Stop reason: WASHOUT

## 2023-11-09 RX ORDER — ALPRAZOLAM 0.25 MG/1
0.25 TABLET ORAL 2 TIMES DAILY PRN
COMMUNITY
Start: 2023-09-15

## 2023-11-09 RX ORDER — ACETAMINOPHEN 500 MG
2000 TABLET ORAL
COMMUNITY

## 2023-11-09 RX ORDER — MULTIVITAMIN
1 TABLET ORAL DAILY
COMMUNITY
End: 2023-12-19 | Stop reason: WASHOUT

## 2023-11-09 RX ORDER — MELOXICAM 15 MG/1
15 TABLET ORAL DAILY
COMMUNITY
Start: 2023-08-21

## 2023-11-09 NOTE — PROGRESS NOTES
History present illness: Patient presents today for evaluation of bilateral carpal tunnel syndrome.  She was administered steroid injections.  Those were great and then they wore off.  She is symptomatic bilaterally but worse on the right.  Patient has symptoms that wake from sleep at nighttime and functional issues during the day.      Past medical history: The patient's past medical history, family history, social history, and review of systems were documented on the patient medical intake.  The updated data was reviewed in the electronic medical record.  History is negative except otherwise stated in history of present illness.        Physical examination:  General: Alert and oriented to person, place, and time.  No acute distress and breathing comfortably: Pleasant and cooperative with examination.  HEENT: Head is normocephalic and atraumatic.  Neck: Supple, no visible swelling.  Cardiovascular: No palpable tachycardia  Lungs: No audible wheezing or labored breathing  Abdomen: Nondistended.  Extremities: Evaluation of the bilateral upper extremities finds the patient had palpable radial artery at the wrists with brisk capillary refill to all digits.  Patient has intact sensation to axillary radial median and ulnar nerves.  There are no open wounds.  There are no signs of infection.  There is no evidence of lymphedema or lymphatic streaking.  The patient has supple compartments to bilateral arm forearm and hand.  Positive Tinel's over grocer median nerve to bilateral wrist.        Radiology:      Assessment: Bilateral carpal tunnel syndrome      Plan: Treatment options were discussed.  We talked about operative and nonoperative strategies.  The patient elects to proceed forth with surgery by way of right carpal tunnel release through wide-awake approach anesthesia.        Procedure:

## 2023-11-12 ENCOUNTER — HOSPITAL ENCOUNTER (OUTPATIENT)
Dept: CARDIOLOGY | Facility: HOSPITAL | Age: 53
Discharge: HOME | End: 2023-11-12
Payer: COMMERCIAL

## 2023-11-12 PROCEDURE — 93005 ELECTROCARDIOGRAM TRACING: CPT

## 2023-12-13 LAB — HOLD SPECIMEN: NORMAL

## 2023-12-18 ENCOUNTER — TELEPHONE (OUTPATIENT)
Dept: ORTHOPEDIC SURGERY | Facility: CLINIC | Age: 53
End: 2023-12-18
Payer: COMMERCIAL

## 2023-12-18 RX ORDER — SODIUM CHLORIDE, SODIUM LACTATE, POTASSIUM CHLORIDE, CALCIUM CHLORIDE 600; 310; 30; 20 MG/100ML; MG/100ML; MG/100ML; MG/100ML
100 INJECTION, SOLUTION INTRAVENOUS CONTINUOUS
Status: CANCELLED | OUTPATIENT
Start: 2023-12-18

## 2023-12-18 NOTE — TELEPHONE ENCOUNTER
Patient called and left a  - she has questions regarding her upcoming surgery this Friday, 12-22-23.   She can be reached at [phone].

## 2023-12-18 NOTE — DISCHARGE INSTRUCTIONS
Medication given may have significant effects after discharge. Therefore on the day of surgery:  1) you must be accompanied by a responsible adult upon discharge and for 24 hours after surgery.  Do not drive a motor vehicle, operate machinery, power tools or appliance, drink alcoholic beverages, or make critical decisions for 24 hours  2) Be aware of dizziness, which may cause a fall. Change positions slowly.  3) Eating: you may resume your regular diet but it is better to increase intake slowly with mild foods and working up to your regular diet. No greasy, fried or spicy foods today.  4) Nausea/Vomiting: Nausea and vomiting may occur as you become more active or begin to increase food intake. If this should happen, decrease activity and return to liquids. If the problem persists, call your surgeon  5) Pain: Your surgeon may have given you a prescription for pain medication. Take pain medication with food as prescribed. Pain medication may cause constipation, so drink plenty of fluids. If your pain medication does not provide adequate relief, call your surgeon  6) Urinating: Notify your surgeon if you have not urinated within 12 hours after discharge  7) Ice: Apply ice to operative site for 20 min 5-6 times a day or use Polar care as instructed  8) Dressing:   [x]  Remove dressing in 3 Days   [x]  Leave open to air after initial dressing is taken off and incision is dry. (If Steri-Strips are applied, leave them in place.)   [x]  No baths, hots tubs, pools, or submerging in fresh water sources. Okay to begin showering and normal hand washing after dressing removal.     []  Leave dressing in place. Keep dressing/ incision clean and dry.      9) Activity:    Shoulder/ Elbow/Hand:   [x]  Elevate extremity    []  Sling   [] At all times (except for exercises and showering)  [] As needed only for comfort   [] Begin daily motion exercises out of sling as instructed   [x]  Bend and flex fingers/wrist/elbow frequently    [] Non-weight bearing/No lifting/gripping/squeezing to the surgical limb   [x] No lifting greater than 1 lb until follow-up visit      10) Begin physical therapy if advised by your physician:   [] Before returning to see you doctor    [x] Will discuss possible need at follow-up visit   [] Will be paired with your follow-up visit in [place]    11) Call your doctor at [phone] for an appointment (or follow up as scheduled)    Contact [facility] office if  Increased redness, swelling, drainage of any kind, and/or pain to surgery site.  As well as new onset fevers and or chills.  These could signify an infection.  Calf or thigh tenderness to touch as well as increased swelling or redness.  This could signify a clot formation.  Numbness or tingling to an area around the incision site or below the incision site (toes). Or if the operative extremity becomes cold, blue.  Any rash appears, increased  or new onset nausea/vomiting occur.  This may indicate a reaction to a medication.  Temp is 38.5 C (101F)  12) If you have any concerns or questions, please call [facility] surgeon on call. The 24- hour phone is [phone]  13) If you are unable to contact your surgeon, in an emergency situation, go to the nearest hospital

## 2023-12-18 NOTE — TELEPHONE ENCOUNTER
Sent [employer] message asking patient what her questions are for documentation purposes and to be able to discuss them with Dr. Garcia.

## 2023-12-19 VITALS — HEIGHT: 69 IN | BODY MASS INDEX: 21.71 KG/M2 | WEIGHT: 146.61 LBS

## 2023-12-19 DIAGNOSIS — G89.18 POST-OPERATIVE PAIN: ICD-10-CM

## 2023-12-19 RX ORDER — HYDROCODONE BITARTRATE AND ACETAMINOPHEN 5; 325 MG/1; MG/1
1 TABLET ORAL EVERY 8 HOURS PRN
Qty: 6 TABLET | Refills: 0 | Status: SHIPPED | OUTPATIENT
Start: 2023-12-19 | End: 2023-12-21

## 2023-12-19 NOTE — PREPROCEDURE INSTRUCTIONS
Pre op instructions reviewed with patient including; hospital/parking/procedure location, must have , and day of procedure routine.

## 2023-12-21 ENCOUNTER — ANESTHESIA EVENT (OUTPATIENT)
Dept: OPERATING ROOM | Facility: HOSPITAL | Age: 53
End: 2023-12-21

## 2023-12-22 ENCOUNTER — ANESTHESIA (OUTPATIENT)
Dept: OPERATING ROOM | Facility: HOSPITAL | Age: 53
End: 2023-12-22
Payer: COMMERCIAL

## 2023-12-22 NOTE — PROGRESS NOTES
Spoke with pt,having covid symptoms since yeserday.Tested positive for covid today.she is not high risk ,discussed with pt,she prefers not to take paxlovid at this time.she will call back if symptoms getting worse ,currently symptoms are mild.

## 2024-01-02 ENCOUNTER — APPOINTMENT (OUTPATIENT)
Dept: ORTHOPEDIC SURGERY | Facility: CLINIC | Age: 54
End: 2024-01-02
Payer: COMMERCIAL

## 2024-02-12 NOTE — DISCHARGE INSTRUCTIONS
Medication given may have significant effects after discharge. Therefore on the day of surgery:  1) You must be accompanied by a responsible adult upon discharge and for 24 hours after surgery.  Do not drive a motor vehicle, operate machinery, power tools or appliance, drink alcoholic beverages, or make critical decisions for 24 hours.  2) Be aware of dizziness, which may cause a fall. Change positions slowly.  3) Eating: you may resume your regular diet but it is better to increase intake slowly with mild foods and working up to your regular diet. No greasy, fried or spicy foods today.  4) Nausea/Vomiting: Nausea and vomiting may occur as you become more active or begin to increase food intake. If this should happen, decrease activity and return to liquids. If the problem persists, call your surgeon  5) Pain: Your surgeon may have given you a prescription for pain medication. Take pain medication with food as prescribed. Pain medication may cause constipation, so drink plenty of fluids. If your pain medication does not provide adequate relief, call your surgeon  6) Urinating: Notify your surgeon if you have not urinated within 12 hours after discharge  7) Ice: Apply ice to operative site for 20 min 5-6 times a day or use Polar care as instructed  8) Dressing:   [x]  Remove dressing in 3 Days   [x]  Leave open to air or apply simple Band-Aid after initial dressing is taken off and incision is dry. (If Steri-Strips are applied, leave them in place.)   [x]  No baths, hots tubs, pools, or submerging in fresh water sources. Okay to begin showering and normal hand washing after dressing removal.     []  Leave dressing in place. Keep dressing/ incision clean and dry.      9) Activity:    Shoulder/ Elbow/Hand:   [x]  Elevate extremity    []  Sling   [] At all times (except for exercises and showering)  [] As needed only for comfort   [] Begin daily motion exercises out of sling as instructed   [x]  Bend and flex  fingers/wrist/elbow frequently   [] Non-weight bearing/No lifting/gripping/squeezing to the surgical limb   [x] No lifting greater than 1 lb until follow-up visit      10) Begin physical therapy if advised by your physician:   [] Before returning to see you doctor    [x] Will discuss possible need at follow-up visit   [] Will be paired with your follow-up visit in [place]    11) Call your doctor at [phone] for an appointment (or follow up as scheduled)    Contact [facility] office if  Increased redness, swelling, drainage of any kind, and/or pain to surgery site.  As well as new onset fevers and or chills.  These could signify an infection.  Calf or thigh tenderness to touch as well as increased swelling or redness.  This could signify a clot formation.  Numbness or tingling to an area around the incision site or below the incision site (toes). Or if the operative extremity becomes cold, blue.  Any rash appears, increased  or new onset nausea/vomiting occur.  This may indicate a reaction to a medication.  Temp is 38.5 C (101F)  12) If you have any concerns or questions, please call [facility] surgeon on call. The 24- hour phone is [phone]  13) If you are unable to contact your surgeon, in an emergency situation, go to the nearest hospital  Nerve Blocks    Why is this procedure done?  Nerve blocks can help to manage pain. By giving you a drug into an exact group of nerves, your doctor may be able to block pain to a specific part of your body. Some nerve blocks are used after surgery, especially if you have had an abdominal surgery. Nerve blocks are used before surgery to lessen the need for opioid drugs during and after surgery.  There are a few kinds of nerve blocks. Some nerve blocks are used to:  Treat pain. These may have a pain drug and a drug to help with swelling.  Find where your pain is coming from. This kind of nerve block will have a pain drug that lasts for only a  certain amount of time.  See if another kind of treatment like surgery will help your pain.  Prevent pain during or after a procedure.  Help you avoid surgery.  Give relief of pain during and after surgery.  Lessen the use of opioid drugs needed for pain after surgery.  Block the pain in an area of the body during surgery as anesthesia.  What will the results be?  The nerve block may help to treat or ease your pain. The area may be numb. You may have some pain relief right away. You may be able to use fewer pain medicines after surgery. It also may be easier for you to move around after surgery. Some nerve blocks go away within a few hours. Others give you pain relief for a day or so to a few months or longer. Some nerve blocks can take a few days to work fully.  What happens before the procedure?  Your doctor will ask you about your health history. Talk to the doctor about:  All the drugs you are taking. Be sure to include all prescription, over the counter, and herbal supplements. Tell the doctor if you have any drug allergy. Bring a list of drugs you take with you.  Any bleeding problems. Be sure to tell your doctor if you are taking any drugs that may cause bleeding. Some of these are warfarin, rivaroxaban, apixaban, ticagrelor, clopidogrel, ibuprofen, naproxen, or aspirin. Certain vitamins and herbs, such as garlic and fish oil, may also add to the risk for bleeding. You may need to stop these drugs as well. Talk to your doctor about them.  What happens during the procedure?  Sometimes, the doctor will give you a special drug to make you sleepy for the nerve block. Other times, you are completely awake. You may also have a nerve block as a part of your surgery.  The doctor will position you in a way to give them easy access to where you will be having the nerve block.  The doctor will clean the area and give you a local numbing drug. The doctor will use a long thin needle to give you the nerve block. Often, the  doctor will use a special x-ray, ultrasound, or CT scan to make sure the needle is in the right place. The doctor will inject the drug close to the nerve that is causing your pain. Sometimes they inject the drug in an area that will block pain from a few nerves.  The doctor will take out the needle and place a clean bandage on your skin.  Sometimes, the doctor may leave a catheter in place to deliver medicine over 1 to 2 days. You may have to return to your doctor's office to have the catheter removed.  The procedure takes 15 to 30 minutes.  What happens after the procedure?  If you are not having surgery, you will be able to go home the same day. You may be asked to rest for 15 to 30 minutes. If the doctor gives you a special drug to make you sleepy for the procedure, you will need someone to drive you home.  What care is needed at home?  You will be allowed to shower or take a bath later that same day unless you have a catheter still in place.  You may need other medicines to help with pain as your nerve block wears off.  What follow-up care is needed?  As your body absorbs the drugs, your pain may come back. Talk to your doctor about if you need another nerve block and how often you can have a nerve block.  What problems could happen?  Infection  Bleeding or bruising  Pain at the injection site  Raised blood sugar  Rash  Itching  Numbness  Nerve injury  Allergic reaction  Puncture or laceration of an organ like the liver, spleen. or bowel  Numbing medicine injected into a blood vessel  Where can I learn more?  American Society of Regional Anesthesia  https://www.sammy.com/patient-information/regional-anesthesia  NHS  https://www.nhs.uk/conditions/epidural/  NHS  https://www.nhs.uk/conditions/local-anaesthesia/  [employer] [place]  http://www.radiologyinfo.org/en/info.cfm?pg=nerveblock  Last Reviewed Date  2020-04-22

## 2024-02-15 ENCOUNTER — ANESTHESIA EVENT (OUTPATIENT)
Dept: OPERATING ROOM | Facility: HOSPITAL | Age: 54
End: 2024-02-15
Payer: COMMERCIAL

## 2024-02-15 RX ORDER — SODIUM CHLORIDE, SODIUM LACTATE, POTASSIUM CHLORIDE, CALCIUM CHLORIDE 600; 310; 30; 20 MG/100ML; MG/100ML; MG/100ML; MG/100ML
100 INJECTION, SOLUTION INTRAVENOUS CONTINUOUS
Status: CANCELLED | OUTPATIENT
Start: 2024-02-15

## 2024-02-15 RX ORDER — HYDROCODONE BITARTRATE AND ACETAMINOPHEN 5; 325 MG/1; MG/1
1 TABLET ORAL EVERY 8 HOURS PRN
Qty: 6 TABLET | Refills: 0 | Status: SHIPPED | OUTPATIENT
Start: 2024-02-15 | End: 2024-02-17

## 2024-02-15 RX ORDER — OXYCODONE HYDROCHLORIDE 5 MG/1
5 TABLET ORAL EVERY 4 HOURS PRN
Status: CANCELLED | OUTPATIENT
Start: 2024-02-15

## 2024-02-15 RX ORDER — ONDANSETRON HYDROCHLORIDE 2 MG/ML
4 INJECTION, SOLUTION INTRAVENOUS ONCE AS NEEDED
Status: CANCELLED | OUTPATIENT
Start: 2024-02-15

## 2024-02-15 RX ORDER — FENTANYL CITRATE 50 UG/ML
50 INJECTION, SOLUTION INTRAMUSCULAR; INTRAVENOUS EVERY 5 MIN PRN
Status: CANCELLED | OUTPATIENT
Start: 2024-02-15

## 2024-02-15 RX ORDER — MEPERIDINE HYDROCHLORIDE 25 MG/ML
12.5 INJECTION INTRAMUSCULAR; INTRAVENOUS; SUBCUTANEOUS EVERY 10 MIN PRN
Status: CANCELLED | OUTPATIENT
Start: 2024-02-15

## 2024-02-15 SDOH — HEALTH STABILITY: MENTAL HEALTH: CURRENT SMOKER: 0

## 2024-02-15 NOTE — PREPROCEDURE INSTRUCTIONS
Pt to call Dr Garcia office regarding post-op prescription.

## 2024-02-15 NOTE — ANESTHESIA PREPROCEDURE EVALUATION
Patient: Sandra Dutton    Procedure Information       Date/Time: 02/16/24 0730    Procedure: RIGHT CARPAL TUNNEL RELEASE WIDE AWAKE BLOCK DONE BY ANESTHESIA TEAM (Right: Wrist) - WIDE AWAKE BLOCK DONE BY ANESTHESIA TEAM    Location: [facility] / [facility]    Surgeons: Demetrio Garcia, DO            Relevant Problems   GI   (+) Irritable bowel syndrome (IBS)      Neuro/Psych   (+) Carpal tunnel syndrome, right upper limb      Musculoskeletal   (+) Carpal tunnel syndrome, right upper limb       Clinical information reviewed:   Tobacco  Allergies  Meds   Med Hx  Surg Hx   Fam Hx  Soc Hx        NPO Detail:  No data recorded     Physical Exam    Airway  Mallampati: II  TM distance: >3 FB  Neck ROM: full     Cardiovascular    Dental - normal exam     Pulmonary    Abdominal        Anesthesia Plan    History of general anesthesia?: yes  History of complications of general anesthesia?: no    ASA 2     MAC     The patient is not a current smoker.    intravenous induction   Anesthetic plan and risks discussed with patient.    Plan discussed with CRNA.  
room air

## 2024-02-16 ENCOUNTER — HOSPITAL ENCOUNTER (OUTPATIENT)
Facility: HOSPITAL | Age: 54
Setting detail: OUTPATIENT SURGERY
Discharge: HOME | End: 2024-02-16
Attending: ORTHOPAEDIC SURGERY | Admitting: ORTHOPAEDIC SURGERY
Payer: COMMERCIAL

## 2024-02-16 ENCOUNTER — ANESTHESIA (OUTPATIENT)
Dept: OPERATING ROOM | Facility: HOSPITAL | Age: 54
End: 2024-02-16
Payer: COMMERCIAL

## 2024-02-16 VITALS
SYSTOLIC BLOOD PRESSURE: 170 MMHG | TEMPERATURE: 98.1 F | BODY MASS INDEX: 21.55 KG/M2 | RESPIRATION RATE: 16 BRPM | OXYGEN SATURATION: 100 % | HEART RATE: 80 BPM | DIASTOLIC BLOOD PRESSURE: 86 MMHG | WEIGHT: 145.5 LBS | HEIGHT: 69 IN

## 2024-02-16 DIAGNOSIS — G56.01 CARPAL TUNNEL SYNDROME, RIGHT UPPER LIMB: ICD-10-CM

## 2024-02-16 DIAGNOSIS — G89.18 POST-OP PAIN: Primary | ICD-10-CM

## 2024-02-16 LAB — PREGNANCY TEST URINE, POC: NEGATIVE

## 2024-02-16 PROCEDURE — 2500000005 HC RX 250 GENERAL PHARMACY W/O HCPCS: Performed by: ANESTHESIOLOGY

## 2024-02-16 PROCEDURE — 3700000001 HC GENERAL ANESTHESIA TIME - INITIAL BASE CHARGE: Performed by: ORTHOPAEDIC SURGERY

## 2024-02-16 PROCEDURE — 81025 URINE PREGNANCY TEST: CPT | Performed by: PHYSICIAN ASSISTANT

## 2024-02-16 PROCEDURE — 3600000008 HC OR TIME - EACH INCREMENTAL 1 MINUTE - PROCEDURE LEVEL THREE: Performed by: ORTHOPAEDIC SURGERY

## 2024-02-16 PROCEDURE — 2500000004 HC RX 250 GENERAL PHARMACY W/ HCPCS (ALT 636 FOR OP/ED): Performed by: PHYSICIAN ASSISTANT

## 2024-02-16 PROCEDURE — 7100000009 HC PHASE TWO TIME - INITIAL BASE CHARGE: Performed by: ORTHOPAEDIC SURGERY

## 2024-02-16 PROCEDURE — 3700000002 HC GENERAL ANESTHESIA TIME - EACH INCREMENTAL 1 MINUTE: Performed by: ORTHOPAEDIC SURGERY

## 2024-02-16 PROCEDURE — 2500000004 HC RX 250 GENERAL PHARMACY W/ HCPCS (ALT 636 FOR OP/ED): Performed by: ANESTHESIOLOGY

## 2024-02-16 PROCEDURE — 3600000003 HC OR TIME - INITIAL BASE CHARGE - PROCEDURE LEVEL THREE: Performed by: ORTHOPAEDIC SURGERY

## 2024-02-16 PROCEDURE — A4217 STERILE WATER/SALINE, 500 ML: HCPCS | Performed by: ORTHOPAEDIC SURGERY

## 2024-02-16 PROCEDURE — 7100000010 HC PHASE TWO TIME - EACH INCREMENTAL 1 MINUTE: Performed by: ORTHOPAEDIC SURGERY

## 2024-02-16 PROCEDURE — 2500000004 HC RX 250 GENERAL PHARMACY W/ HCPCS (ALT 636 FOR OP/ED): Performed by: ORTHOPAEDIC SURGERY

## 2024-02-16 PROCEDURE — 64721 CARPAL TUNNEL SURGERY: CPT | Performed by: ORTHOPAEDIC SURGERY

## 2024-02-16 RX ORDER — SODIUM CHLORIDE, SODIUM LACTATE, POTASSIUM CHLORIDE, CALCIUM CHLORIDE 600; 310; 30; 20 MG/100ML; MG/100ML; MG/100ML; MG/100ML
100 INJECTION, SOLUTION INTRAVENOUS CONTINUOUS
Status: DISCONTINUED | OUTPATIENT
Start: 2024-02-16 | End: 2024-02-16 | Stop reason: HOSPADM

## 2024-02-16 RX ORDER — SODIUM CHLORIDE 0.9 G/100ML
IRRIGANT IRRIGATION AS NEEDED
Status: DISCONTINUED | OUTPATIENT
Start: 2024-02-16 | End: 2024-02-16 | Stop reason: HOSPADM

## 2024-02-16 RX ADMIN — LIDOCAINE HYDROCHLORIDE,EPINEPHRINE BITARTRATE 11 ML: 10; .01 INJECTION, SOLUTION INFILTRATION; PERINEURAL at 07:05

## 2024-02-16 RX ADMIN — LIDOCAINE HYDROCHLORIDE,EPINEPHRINE BITARTRATE 11 ML: 10; .01 INJECTION, SOLUTION INFILTRATION; PERINEURAL at 07:04

## 2024-02-16 RX ADMIN — SODIUM CHLORIDE, POTASSIUM CHLORIDE, SODIUM LACTATE AND CALCIUM CHLORIDE 100 ML/HR: 600; 310; 30; 20 INJECTION, SOLUTION INTRAVENOUS at 05:58

## 2024-02-16 ASSESSMENT — PAIN SCALES - GENERAL
PAINLEVEL_OUTOF10: 2
PAINLEVEL_OUTOF10: 0 - NO PAIN
PAIN_LEVEL: 0

## 2024-02-16 ASSESSMENT — ENCOUNTER SYMPTOMS
HEMATOLOGIC/LYMPHATIC NEGATIVE: 1
CARDIOVASCULAR NEGATIVE: 1
NUMBNESS: 1
CONSTITUTIONAL NEGATIVE: 1
ENDOCRINE NEGATIVE: 1
EYES NEGATIVE: 1
RESPIRATORY NEGATIVE: 1
GASTROINTESTINAL NEGATIVE: 1
PSYCHIATRIC NEGATIVE: 1

## 2024-02-16 ASSESSMENT — PAIN - FUNCTIONAL ASSESSMENT
PAIN_FUNCTIONAL_ASSESSMENT: 0-10
PAIN_FUNCTIONAL_ASSESSMENT: 0-10

## 2024-02-16 ASSESSMENT — COLUMBIA-SUICIDE SEVERITY RATING SCALE - C-SSRS
2. HAVE YOU ACTUALLY HAD ANY THOUGHTS OF KILLING YOURSELF?: NO
1. IN THE PAST MONTH, HAVE YOU WISHED YOU WERE DEAD OR WISHED YOU COULD GO TO SLEEP AND NOT WAKE UP?: NO
6. HAVE YOU EVER DONE ANYTHING, STARTED TO DO ANYTHING, OR PREPARED TO DO ANYTHING TO END YOUR LIFE?: NO

## 2024-02-16 ASSESSMENT — PAIN DESCRIPTION - DESCRIPTORS: DESCRIPTORS: ACHING

## 2024-02-16 NOTE — ANESTHESIA PROCEDURE NOTES
Peripheral Block    Patient location during procedure: pre-op  Start time: 2/16/2024 7:00 AM  End time: 2/16/2024 7:02 AM  Reason for block: procedure for pain, at surgeon's request and post-op pain management  Staffing  Performed: attending   Authorized by: Jonah Mathews MD    Performed by: Jonah Mathews MD  Preanesthetic Checklist  Completed: patient identified, IV checked, site marked, risks and benefits discussed, surgical consent, monitors and equipment checked, pre-op evaluation and timeout performed   Timeout performed at: 2/16/2024 7:00 AM  Peripheral Block  Patient position: laying flat  Prep: alcohol swabs  Patient monitoring: continuous pulse ox  Block type: other  Laterality: right  Injection technique: single-shot  Needle  Needle type: short-bevel   Needle gauge: 21 G  Needle localization: anatomical landmarks  Assessment  Injection assessment: negative aspiration for heme, no paresthesia on injection and incremental injection  Paresthesia pain: none  Heart rate change: no  Slow fractionated injection: yes  Additional Notes  Wrist block.  ETOH prep.  10cc 1% lidocaine with epi 1:200k and NaHCO3 injected in fractionated aliquots after negative aspirations.  Another 10cc of the same solution injected in the palm between the thenar and hypothenar eminences in a similar fashion.  Pt tolerated procedure well.  No complications noted.

## 2024-02-16 NOTE — OP NOTE
RIGHT CARPAL TUNNEL RELEASE WIDE AWAKE BLOCK DONE BY ANESTHESIA TEAM (R) Operative Note     Date: 2024  OR Location: [facility]    Name: Sandra Dutton, : 1970, Age: 53 y.o., MRN: [id number], Sex: female        Preoperative diagnosis: Right carpal tunnel syndrome    Postoperative diagnosis: Right carpal tunnel syndrome    Procedure planned: Right carpal tunnel release    Procedure performed: Right carpal tunnel release    Surgeon: Demetrio Garcia D.O.    Assistant:ANNETTA Cuevas  The physician assistant was present to the entire case. Given the nature of the disease process and the procedure to be performed a skilled surgical assistant was necessary during the case. The assistant was necessary in order to hold retractors and directly assist in the operation. A certified scrub tech was at the back table managing instruments and supplies for the surgical case.    Anesthesia: Local field block using lidocaine with epinephrine solution and monitored by the anesthesia team    Estimated blood loss: Less than 10 mL    Drains: None    Tourniquet: None    Specimens: None    Implants: None    Indications: The patient presented to the office with subjective symptoms physical examination an EMG nerve conduction study test consistent with right carpal tunnel syndrome.  The patient had failure of nonoperative treatment strategies.  After full discussion regarding risks benefits and alternatives the patient elected to forego any additional nonoperative management in favor of right carpal tunnel release.  Informed consent was signed and placed in the chart.    Complications: None noted at the time of surgery    Description of operation: The patient was taken to the operative suite and placed in the supine position on the operating table.  A timeout was performed and the right carpal tunnel was confirmed to be the operative site.  The patient was carefully positioned on the table in such a fashion as to pad all bony  prominences and peripheral nerves.  The patient was then prepped and draped in the normal sterile fashion.  After prepping and draping a longitudinal incision was marked out directly overlying the transverse carpal ligament in line with the ring finger ray.  Forceps were used to gently grasp and pinch the skin in the zone of intended surgical dissection to check for adequate anesthesia.  After confirming adequate anesthesia the 15 blade was used to incise skin and dissect down to the subcutaneous plane.  The palmar aponeurosis was divided in line with the incision to expose the transverse carpal ligament.  The transverse carpal ligament was then meticulously divided under direct visualization, working from distal to proximal, taking care to avoid iatrogenic injury to the underlying contents of the carpal tunnel.  The tenotomy scissors were used to release the most proximal fibers of the transverse carpal ligament along with the most distal fibers of the antebrachial fascia.  This was done under direct visualization.  The distal release of the transverse carpal ligament was carried to the level of the fat change.  Direct inspection and digital palpation confirmed complete release of the carpal tunnel.  Copious irrigation was performed.  Interrupted nylon stitches were used to close the skin.  A bulky nonadherent dressing was placed.  The patient was then allowed to head to recovery in stable condition.  Overall the patient tolerated the procedure well.    Disposition: Stable to PACU            Demetrio Garcia  Phone Number: [phone]

## 2024-02-16 NOTE — ANESTHESIA POSTPROCEDURE EVALUATION
Patient: Sandra Dutton    Procedure Summary       Date: 02/16/24 Room / Location: [facility]    Anesthesia Start: 0729 Anesthesia Stop:     Procedure: RIGHT CARPAL TUNNEL RELEASE WIDE AWAKE BLOCK DONE BY ANESTHESIA TEAM (Right: Wrist) Diagnosis:       Carpal tunnel syndrome, right upper limb      (Carpal tunnel syndrome, right upper limb [G56.01])    Surgeons: Demetrio Garcia DO Responsible Provider: Jonah Mathews MD    Anesthesia Type: MAC ASA Status: 2            Anesthesia Type: MAC    Vitals Value Taken Time   /85 02/16/24 0740   Temp  02/16/24 0740   Pulse 86 02/16/24 0740   Resp 12 02/16/24 0740   SpO2 100 02/16/24 0740       Anesthesia Post Evaluation    Patient location during evaluation: bedside  Patient participation: complete - patient participated  Level of consciousness: awake and alert  Pain score: 0  Pain management: adequate  Airway patency: patent  Cardiovascular status: acceptable and stable  Respiratory status: acceptable and room air  Hydration status: acceptable  Postoperative Nausea and Vomiting: none        No notable events documented.

## 2024-02-16 NOTE — H&P
History and Physical      CHIEF COMPLAINT:  No chief complaint on file.       HISTORY OF PRESENT ILLNESS:      The patient is a 53 y.o. female with significant past medical history of hypertension, arthritis, solitary abdominal biliary nodule who presents with right carpal tunnel syndrome recalcitrant to nonoperative treatment strategies.    Past Medical History:  Past Medical History:   Diagnosis Date    Arthritis     Body mass index (BMI) 29.0-29.9, adult     BMI 29.0-29.9,adult    Carpal tunnel syndrome, bilateral     COVID-19 12/22/2023    Elevated blood-pressure reading, without diagnosis of hypertension     Elevated BP without diagnosis of hypertension    Encounter for immunization     Encounter for immunization    Encounter for screening for malignant neoplasm of colon     Encounter for screening fecal occult blood testing    Encounter for screening for malignant neoplasm of colon 10/29/2021    Encounter for screening colonoscopy    Hernia, inguinal     left    History of high blood pressure     no longer on meds    Overweight     Overweight    Personal history of other endocrine, nutritional and metabolic disease 10/29/2021    History of hypothyroidism    Personal history of other medical treatment 10/29/2021    History of screening mammography    Personal history of other specified conditions     History of headache    Solitary pulmonary nodule     Lung nodule    Wears glasses       Past Surgical History:    Past Surgical History:   Procedure Laterality Date    BREAST BIOPSY      can't remember which side    BUNIONECTOMY Bilateral 01/27/2020    Foot surgery    COLONOSCOPY           Medications Prior to Admission:    No current facility-administered medications on file prior to encounter.     Current Outpatient Medications on File Prior to Encounter   Medication Sig Dispense Refill    ALPRAZolam (Xanax) 0.25 mg tablet Take 1 tablet (0.25 mg) by mouth 2 times a day as needed.      b complex vitamins  "(Vitamins B Complex) capsule Take by mouth once daily.      cholecalciferol (Vitamin D-3) 50 mcg (2,000 unit) capsule Take 1 capsule (50 mcg) by mouth.      meloxicam (Mobic) 15 mg tablet Take 1 tablet (15 mg) by mouth once daily. Take with food.         Allergies:  Sulfa (sulfonamide antibiotics)    Social History:   Social History     Socioeconomic History    Marital status:      Spouse name: Not on file    Number of children: Not on file    Years of education: Not on file    Highest education level: Not on file   Occupational History    Not on file   Tobacco Use    Smoking status: Former     Packs/day: .25     Types: Cigarettes     Quit date:      Years since quittin.1    Smokeless tobacco: Never   Vaping Use    Vaping Use: Never used   Substance and Sexual Activity    Alcohol use: Not on file     Comment: occasional    Drug use: Never    Sexual activity: Not on file   Other Topics Concern    Not on file   Social History Narrative    Not on file     Social Determinants of Health     Financial Resource Strain: Not on file   Food Insecurity: Not on file   Transportation Needs: Not on file   Physical Activity: Not on file   Stress: Not on file   Social Connections: Not on file   Intimate Partner Violence: Not on file   Housing Stability: Not on file       Family History:   Family History   Problem Relation Name Age of Onset    No Known Problems Mother      Hypertension Father      Parkinsonism Father         Review of Systems   Constitutional: Negative.    HENT: Negative.     Eyes: Negative.    Respiratory: Negative.     Cardiovascular: Negative.    Gastrointestinal: Negative.    Endocrine: Negative.    Genitourinary: Negative.    Neurological:  Positive for numbness (Right hand).   Hematological: Negative.    Psychiatric/Behavioral: Negative.          Vitals:  /84   Pulse 73   Temp 36.8 °C (98.2 °F) (Temporal)   Resp 16   Ht 1.753 m (5' 9\")   Wt 66 kg (145 lb 8.1 oz)   SpO2 100%   BMI " 21.49 kg/m²     Physical Exam  Constitutional:       Appearance: Normal appearance.   HENT:      Head: Normocephalic and atraumatic.      Mouth/Throat:      Mouth: Mucous membranes are moist.   Eyes:      Pupils: Pupils are equal, round, and reactive to light.   Cardiovascular:      Pulses: Normal pulses.      Comments: Appears grossly hemodynamically stable  Pulmonary:      Effort: Pulmonary effort is normal.      Breath sounds: Normal breath sounds. No wheezing.   Abdominal:      Palpations: Abdomen is soft.   Musculoskeletal:         General: Normal range of motion.      Cervical back: Neck supple.   Skin:     General: Skin is warm and dry.      Capillary Refill: Capillary refill takes less than 2 seconds.   Neurological:      Mental Status: She is alert and oriented to person, place, and time.      Comments: Positive Tinel's right median nerve   Psychiatric:         Mood and Affect: Mood normal.         Behavior: Behavior normal.         Thought Content: Thought content normal.         Judgment: Judgment normal.            Assessment:  Right carpal tunnel syndrome recalcitrant to nonoperative treatment strategies.      Plan:  Scheduled for right carpal tunnel release under wide-awake approach.     Ynes Samson PA-C  2/16/2024  7:05 AM

## 2024-02-29 ENCOUNTER — OFFICE VISIT (OUTPATIENT)
Dept: ORTHOPEDIC SURGERY | Facility: CLINIC | Age: 54
End: 2024-02-29
Payer: COMMERCIAL

## 2024-02-29 VITALS — BODY MASS INDEX: 21.48 KG/M2 | HEIGHT: 69 IN | WEIGHT: 145 LBS

## 2024-02-29 DIAGNOSIS — L03.119 CELLULITIS OF HAND: Primary | ICD-10-CM

## 2024-02-29 DIAGNOSIS — G56.01 CARPAL TUNNEL SYNDROME OF RIGHT WRIST: ICD-10-CM

## 2024-02-29 PROCEDURE — 99024 POSTOP FOLLOW-UP VISIT: CPT | Performed by: ORTHOPAEDIC SURGERY

## 2024-02-29 PROCEDURE — 1036F TOBACCO NON-USER: CPT | Performed by: ORTHOPAEDIC SURGERY

## 2024-02-29 RX ORDER — IBUPROFEN 800 MG/1
800 TABLET ORAL EVERY 8 HOURS PRN
Qty: 30 TABLET | Refills: 0 | Status: SHIPPED | OUTPATIENT
Start: 2024-02-29 | End: 2024-04-10 | Stop reason: SINTOL

## 2024-02-29 RX ORDER — CEPHALEXIN 500 MG/1
500 CAPSULE ORAL 3 TIMES DAILY
Qty: 30 CAPSULE | Refills: 0 | Status: SHIPPED | OUTPATIENT
Start: 2024-02-29 | End: 2024-03-10

## 2024-02-29 ASSESSMENT — PAIN - FUNCTIONAL ASSESSMENT: PAIN_FUNCTIONAL_ASSESSMENT: 0-10

## 2024-02-29 ASSESSMENT — PAIN SCALES - GENERAL: PAINLEVEL_OUTOF10: 6

## 2024-02-29 NOTE — PROGRESS NOTES
2/29/2024    Chief Complaint   Patient presents with    Right Wrist - Post-op     1. Right wrist CTR  DOS- 2/16/24 (15 Days out)       History of Present Illness:  Patient Sandra Dutton , 53 y.o. female, presents today, 2/29/2024, for evaluation of right wrist  carpal tunnel release, 2 weeks postop .  Patient states numbness and tingling has resolved the interim since surgery, but she still has persistent pain and discomfort especially right over zone of surgical dissection.  She is having some mild redness surrounding this area to she denies any von purulence or drainage.  Denies any fevers, chills, constitutional symptoms.       Review of Systems:   GENERAL: Negative  GI: Negative  MUSCULOSKELETAL: See HPI  SKIN: Negative  NEURO:  Negative     Physical Exam:  GENERAL:  Alert and oriented to person, place, and time.  No acute distress and breathing comfortably; pleasant and cooperative with the examination.  HEENT:  Head is normocephalic and atraumatic.  NECK:  Supple, no visible swelling.  CARDIOVASCULAR:  No palpable tachycardia.  LUNGS:  No audible wheezing or labored breathing.  ABDOMEN:  Nondistended.  Extremities: The surgical incision is clean, dry, intact, and appears to be healing well.  No active bleeding.  There is some mild erythema in line with the zone of surgical dissection that may be consistent with early superficial incisional infection versus suture reaction.  Appropriate functional ROM demonstrated with flexion/extension of the digits, and flexion/extension/pronosupination of the wrist.     Imaging/Test Results:  None today.     Assessment:  Right carpal tunnel release, 2 weeks out with resolution of numbness and tingling but some incisional erythema concerning for early infection.     Plan:  Sutures were removed in the office today.  The patient can begin to weight bear as tolerated.  We discussed and reviewed home exercise program for range of motion recovery, scar massage, and  desensitization techniques.  They can return to activities as tolerated.  Will give her prescription for Keflex 500 mg 3 times daily for 10-day supply, she will take this for 1 week.  She will take the remainder with her as she is leaving [facility].  She will follow-up again on 3/11/2024 for repeat clinical exam.  She is requesting refill of pain medication, given prescription for ibuprofen 800 mg.  All patient questions answered at today's visit.    Ynes Samson PA-C

## 2024-03-11 ENCOUNTER — OFFICE VISIT (OUTPATIENT)
Dept: ORTHOPEDIC SURGERY | Facility: CLINIC | Age: 54
End: 2024-03-11
Payer: COMMERCIAL

## 2024-03-11 DIAGNOSIS — L03.119 CELLULITIS OF HAND: ICD-10-CM

## 2024-03-11 DIAGNOSIS — G56.01 CARPAL TUNNEL SYNDROME OF RIGHT WRIST: Primary | ICD-10-CM

## 2024-03-11 PROCEDURE — 1036F TOBACCO NON-USER: CPT | Performed by: ORTHOPAEDIC SURGERY

## 2024-03-11 PROCEDURE — 99024 POSTOP FOLLOW-UP VISIT: CPT | Performed by: ORTHOPAEDIC SURGERY

## 2024-03-11 NOTE — PROGRESS NOTES
3/11/2024    Chief Complaint   Patient presents with    Right Wrist - Follow-up     RT CTR with incisional erythema  DOS: 2/16/24       History of Present Illness:  Patient Sandra Dutton , 53 y.o. female, presents today, 3/11/2024, for evaluation of right hand  carpal tunnel release with postop erythema at the incision site, 3 and half weeks out .  Sandra has completed prescribed course of antibiotics.  Erythema has resolved.  Pain and swelling continue to decrease.  She has no numbness and tingling.       Review of Systems:   GENERAL: Negative  GI: Negative  MUSCULOSKELETAL: See HPI  SKIN: Negative  NEURO:  Negative     Physical Exam:  GENERAL:  Alert and oriented to person, place, and time.  No acute distress and breathing comfortably; pleasant and cooperative with the examination.  HEENT:  Head is normocephalic and atraumatic.  NECK:  Supple, no visible swelling.  CARDIOVASCULAR:  No palpable tachycardia.  LUNGS:  No audible wheezing or labored breathing.  ABDOMEN:  Nondistended.  Extremities: Evaluation of the right upper extremity finds the patient to have a palpable radial artery at the wrist with brisk capillary refill to all digits. The patient has intact sensorium to axillary, radial, median and ulnar nerves. There are no open wounds. There are no signs of infection. There is no evidence of lymphedema or lymphatic streaking. The patient has supple compartments of the right arm, forearm and hand.  Surgical incision is well-healed.  She has functional arc of motion of flexion extension pronosupination of the wrist.  Full composite fist.     Imaging/Test Results:  None today.     Assessment:  Right carpal tunnel release with postop erythema at the incision site resolved after oral antibiotics.     Plan:  Patient can continue with activities and weightbearing as tolerated to the right upper extremity.  Erythema has resolved.  She will follow-up with our office in as-needed basis.  All questions answered  at today's visit.  We reviewed range of motion for home exercise program and scar massage/desensitization techniques.    Ynes Samson PA-C

## 2024-04-10 ENCOUNTER — OFFICE VISIT (OUTPATIENT)
Dept: PRIMARY CARE | Facility: CLINIC | Age: 54
End: 2024-04-10
Payer: COMMERCIAL

## 2024-04-10 VITALS
WEIGHT: 149.5 LBS | HEART RATE: 86 BPM | HEIGHT: 69 IN | OXYGEN SATURATION: 99 % | SYSTOLIC BLOOD PRESSURE: 118 MMHG | BODY MASS INDEX: 22.14 KG/M2 | DIASTOLIC BLOOD PRESSURE: 76 MMHG | TEMPERATURE: 98.1 F

## 2024-04-10 DIAGNOSIS — K40.90 INGUINAL HERNIA, LEFT: Primary | ICD-10-CM

## 2024-04-10 DIAGNOSIS — R30.0 DYSURIA: ICD-10-CM

## 2024-04-10 DIAGNOSIS — R39.9 UTI SYMPTOMS: ICD-10-CM

## 2024-04-10 DIAGNOSIS — N39.0 URINARY TRACT INFECTION WITHOUT HEMATURIA, SITE UNSPECIFIED: ICD-10-CM

## 2024-04-10 LAB
APPEARANCE UR: ABNORMAL
BILIRUB UR STRIP.AUTO-MCNC: NEGATIVE MG/DL
COLOR UR: ABNORMAL
GLUCOSE UR STRIP.AUTO-MCNC: NEGATIVE MG/DL
KETONES UR STRIP.AUTO-MCNC: NEGATIVE MG/DL
LEUKOCYTE ESTERASE UR QL STRIP.AUTO: ABNORMAL
NITRITE UR QL STRIP.AUTO: NEGATIVE
PH UR STRIP.AUTO: 6 [PH]
POC APPEARANCE, URINE: ABNORMAL
POC BILIRUBIN, URINE: NEGATIVE
POC BLOOD, URINE: ABNORMAL
POC COLOR, URINE: YELLOW
POC GLUCOSE, URINE: NEGATIVE MG/DL
POC KETONES, URINE: NEGATIVE MG/DL
POC LEUKOCYTES, URINE: ABNORMAL
POC NITRITE,URINE: NEGATIVE
POC PH, URINE: 5 PH
POC PROTEIN, URINE: ABNORMAL MG/DL
POC SPECIFIC GRAVITY, URINE: 1.01
POC UROBILINOGEN, URINE: 0.2 EU/DL
PROT UR STRIP.AUTO-MCNC: NEGATIVE MG/DL
RBC # UR STRIP.AUTO: ABNORMAL /UL
RBC #/AREA URNS AUTO: ABNORMAL /HPF
SP GR UR STRIP.AUTO: 1
UROBILINOGEN UR STRIP.AUTO-MCNC: <2 MG/DL
WBC #/AREA URNS AUTO: >50 /HPF
WBC CLUMPS #/AREA URNS AUTO: ABNORMAL /HPF

## 2024-04-10 PROCEDURE — 1036F TOBACCO NON-USER: CPT | Performed by: FAMILY MEDICINE

## 2024-04-10 PROCEDURE — 87086 URINE CULTURE/COLONY COUNT: CPT

## 2024-04-10 PROCEDURE — 81002 URINALYSIS NONAUTO W/O SCOPE: CPT | Performed by: FAMILY MEDICINE

## 2024-04-10 PROCEDURE — 99213 OFFICE O/P EST LOW 20 MIN: CPT | Performed by: FAMILY MEDICINE

## 2024-04-10 PROCEDURE — 81001 URINALYSIS AUTO W/SCOPE: CPT

## 2024-04-10 RX ORDER — CIPROFLOXACIN 500 MG/1
500 TABLET ORAL 2 TIMES DAILY
Qty: 10 TABLET | Refills: 0 | Status: SHIPPED | OUTPATIENT
Start: 2024-04-10 | End: 2024-04-15

## 2024-04-10 ASSESSMENT — PATIENT HEALTH QUESTIONNAIRE - PHQ9
1. LITTLE INTEREST OR PLEASURE IN DOING THINGS: NOT AT ALL
2. FEELING DOWN, DEPRESSED OR HOPELESS: NOT AT ALL
SUM OF ALL RESPONSES TO PHQ9 QUESTIONS 1 AND 2: 0

## 2024-04-10 NOTE — PROGRESS NOTES
"Subjective   Chief complaint: Sandra Dutton is a 53 y.o. female who presents for UTI (Patient in office today for possible UTI x 2 weeks. Pt states that she is having urinary frequency, pressure and right side back pain. Has tried cranberry juice and water with no relief. ).    HPI:  HPI  Patient complains of dysuria.  Frequency urgency hesitancy.  Some pressure.  Right side.  Low back.  No fevers chills.  No nausea vomiting.  Also has a history of an inguinal hernia.  Was referred to general surgery for repair.  However COVID had to cancel it.  Now is getting bigger and worse.  Examination reveals no CVA tenderness she is alert and polite cooperative no distress nontoxic.  Mild suprapubic discomfort.  No rebound or guarding.  Antibiotic.  Urine culture.  Adjust as indicated.  General surgery consultation.  Objective   /76 (BP Location: Left arm, Patient Position: Sitting, BP Cuff Size: Adult)   Pulse 86   Temp 36.7 °C (98.1 °F) (Temporal)   Ht 1.753 m (5' 9\")   Wt 67.8 kg (149 lb 8 oz)   SpO2 99%   BMI 22.08 kg/m²   Physical Exam  Review of Systems   I have reviewed and reconciled the medication list with the patient today.   Current Outpatient Medications:     ALPRAZolam (Xanax) 0.25 mg tablet, Take 1 tablet (0.25 mg) by mouth 2 times a day as needed., Disp: , Rfl:     b complex vitamins (Vitamins B Complex) capsule, Take by mouth once daily., Disp: , Rfl:     cholecalciferol (Vitamin D-3) 50 mcg (2,000 unit) capsule, Take 1 capsule (50 mcg) by mouth., Disp: , Rfl:     meloxicam (Mobic) 15 mg tablet, Take 1 tablet (15 mg) by mouth once daily. Take with food., Disp: , Rfl:     ciprofloxacin (Cipro) 500 mg tablet, Take 1 tablet (500 mg) by mouth 2 times a day for 5 days., Disp: 10 tablet, Rfl: 0     Imaging:  No results found.     Labs reviewed:    Lab Results   Component Value Date    WBC 10.1 10/23/2023    HGB 14.6 10/23/2023    HCT 42.3 10/23/2023     10/23/2023    CHOL 190 04/03/2023    TRIG " 117 04/03/2023    HDL 65.8 04/03/2023    ALT 46 (H) 10/23/2023    AST 27 10/23/2023     10/23/2023    K 3.8 10/23/2023     10/23/2023    CREATININE 0.72 10/23/2023    BUN 17 10/23/2023    CO2 29 10/23/2023    TSH 4.29 (H) 04/03/2023    INR 1.0 11/29/2021       Assessment/Plan   Problem List Items Addressed This Visit             ICD-10-CM    Inguinal hernia, left - Primary K40.90    Relevant Orders    Referral to General Surgery     Other Visit Diagnoses         Codes    UTI symptoms     R39.9    Relevant Medications    ciprofloxacin (Cipro) 500 mg tablet    Other Relevant Orders    POCT UA (nonautomated) manually resulted    Urine Culture    Urinalysis with Reflex Microscopic    Urinary tract infection without hematuria, site unspecified     N39.0    Relevant Medications    ciprofloxacin (Cipro) 500 mg tablet    Other Relevant Orders    Urine Culture    Urinalysis with Reflex Microscopic    Dysuria     R30.0    Relevant Medications    ciprofloxacin (Cipro) 500 mg tablet    Other Relevant Orders    Urine Culture    Urinalysis with Reflex Microscopic            Reinforced lifestyle modifications  Continue current medications as listed  Physical activity as tolerated and healthy diet encouraged  Maintain a healthy weight  Follow up in

## 2024-04-11 LAB — BACTERIA UR CULT: NORMAL

## 2024-05-10 ENCOUNTER — OFFICE VISIT (OUTPATIENT)
Dept: SURGERY | Facility: CLINIC | Age: 54
End: 2024-05-10
Payer: COMMERCIAL

## 2024-05-10 VITALS
WEIGHT: 149 LBS | SYSTOLIC BLOOD PRESSURE: 138 MMHG | DIASTOLIC BLOOD PRESSURE: 80 MMHG | HEIGHT: 69 IN | BODY MASS INDEX: 22.07 KG/M2

## 2024-05-10 DIAGNOSIS — K40.90 INGUINAL HERNIA, LEFT: Primary | ICD-10-CM

## 2024-05-10 PROCEDURE — 99213 OFFICE O/P EST LOW 20 MIN: CPT | Performed by: SURGERY

## 2024-05-10 RX ORDER — SODIUM CHLORIDE, SODIUM LACTATE, POTASSIUM CHLORIDE, CALCIUM CHLORIDE 600; 310; 30; 20 MG/100ML; MG/100ML; MG/100ML; MG/100ML
100 INJECTION, SOLUTION INTRAVENOUS CONTINUOUS
OUTPATIENT
Start: 2024-05-10

## 2024-05-10 RX ORDER — CEFAZOLIN SODIUM 2 G/100ML
2 INJECTION, SOLUTION INTRAVENOUS ONCE
OUTPATIENT
Start: 2024-05-10 | End: 2024-05-10

## 2024-05-10 NOTE — PROGRESS NOTES
Subjective   Patient ID: Sandra Dutton is a 53 y.o. female who presents for New Patient Visit (Here for inguinal hernia. Referred by Dr. Huff).  HPI  Increasingly symptomatic left inguinal hernia, enlarging but remains reducible.  Previously scheduled for repair but canceled during the COVID era.  No bowel or urinary complaints.    Review of Systems   Constitutional: Negative.    HENT: Negative.     Eyes: Negative.    Respiratory: Negative.     Cardiovascular: Negative.    Gastrointestinal: Negative.    Endocrine: Negative.    Genitourinary: Negative.    Musculoskeletal: Negative.    Skin: Negative.    Allergic/Immunologic: Negative.    Neurological: Negative.    Hematological: Negative.    Psychiatric/Behavioral: Negative.           Past Medical History:   Diagnosis Date    Arthritis     Body mass index (BMI) 29.0-29.9, adult     BMI 29.0-29.9,adult    Carpal tunnel syndrome, bilateral     COVID-19 12/22/2023    Elevated blood-pressure reading, without diagnosis of hypertension     Elevated BP without diagnosis of hypertension    Encounter for immunization     Encounter for immunization    Encounter for screening for malignant neoplasm of colon     Encounter for screening fecal occult blood testing    Encounter for screening for malignant neoplasm of colon 10/29/2021    Encounter for screening colonoscopy    Hernia, inguinal     left    History of high blood pressure     no longer on meds    Overweight     Overweight    Personal history of other endocrine, nutritional and metabolic disease 10/29/2021    History of hypothyroidism    Personal history of other medical treatment 10/29/2021    History of screening mammography    Personal history of other specified conditions     History of headache    Solitary pulmonary nodule     Lung nodule    Wears glasses      Past Surgical History:   Procedure Laterality Date    BREAST BIOPSY      can't remember which side    BUNIONECTOMY Bilateral 01/27/2020    Foot surgery     CARPAL TUNNEL RELEASE Right 2024    COLONOSCOPY       Family History   Problem Relation Name Age of Onset    No Known Problems Mother      Hypertension Father      Parkinsonism Father        Social History     Socioeconomic History    Marital status:      Spouse name: Not on file    Number of children: Not on file    Years of education: Not on file    Highest education level: Not on file   Occupational History    Not on file   Tobacco Use    Smoking status: Former     Current packs/day: 0.00     Types: Cigarettes     Quit date: 2022     Years since quittin.3    Smokeless tobacco: Former   Vaping Use    Vaping status: Never Used   Substance and Sexual Activity    Alcohol use: Yes     Alcohol/week: 10.0 standard drinks of alcohol     Types: 5 Glasses of wine, 5 Standard drinks or equivalent per week     Comment: occasional    Drug use: Never    Sexual activity: Yes     Partners: Male   Other Topics Concern    Not on file   Social History Narrative    Not on file     Social Determinants of Health     Financial Resource Strain: Low Risk  (3/13/2023)    Received from [facility]    Overall Financial Resource Strain (CARDIA)     Difficulty of Paying Living Expenses: Not hard at all   Food Insecurity: Not on file (3/13/2023)   Transportation Needs: Unknown (3/13/2023)    Received from [facility]    PRAPARE - Transportation     Lack of Transportation (Medical): Not on file     Lack of Transportation (Non-Medical): No   Physical Activity: Not on file   Stress: Not on file   Social Connections: Not on file   Intimate Partner Violence: Not on file   Housing Stability: Unknown (3/13/2023)    Received from [facility]    Housing Stability Vital Sign     Unable to Pay for Housing in the Last Year: Not on file     Number of Places Lived in the Last Year: Not on file     Unstable Housing in the Last Year: No          Current Outpatient  Medications:     ALPRAZolam (Xanax) 0.25 mg tablet, Take 1 tablet (0.25 mg) by mouth 2 times a day as needed., Disp: , Rfl:     b complex vitamins (Vitamins B Complex) capsule, Take by mouth once daily., Disp: , Rfl:     cholecalciferol (Vitamin D-3) 50 mcg (2,000 unit) capsule, Take 1 capsule (50 mcg) by mouth., Disp: , Rfl:     meloxicam (Mobic) 15 mg tablet, Take 1 tablet (15 mg) by mouth once daily. Take with food., Disp: , Rfl:      Objective   Vitals:    05/10/24 1001   BP: 138/80      Physical Exam  Constitutional:       General: She is not in acute distress.     Appearance: Normal appearance.   HENT:      Head: Normocephalic and atraumatic.      Mouth/Throat:      Pharynx: Oropharynx is clear.   Eyes:      Conjunctiva/sclera: Conjunctivae normal.      Pupils: Pupils are equal, round, and reactive to light.   Cardiovascular:      Rate and Rhythm: Normal rate and regular rhythm.   Pulmonary:      Effort: Pulmonary effort is normal.   Abdominal:      General: Abdomen is flat. There is no distension.      Palpations: Abdomen is soft.      Hernia: A hernia is present.      Comments: Reducible left inguinal hernia moderate size   Musculoskeletal:         General: Normal range of motion.   Skin:     General: Skin is warm and dry.   Neurological:      General: No focal deficit present.      Mental Status: She is alert. Mental status is at baseline.   Psychiatric:         Mood and Affect: Mood normal.         Behavior: Behavior normal.         Thought Content: Thought content normal.         Judgment: Judgment normal.           Assessment/Plan   Problem List Items Addressed This Visit             ICD-10-CM    Inguinal hernia, left - Primary K40.90    Relevant Orders    Case Request Operating Room: Repair Inguinal Hernia Laparoscopy (Completed)    CBC    Basic Metabolic Panel    ECG 12 lead     Etiology and management of inguinal hernia discussed, open versus laparoscopic approaches and use of mesh discussed.  Will  schedule for laparoscopic left inguinal hernia repair possible bilateral.  Indications for procedure discussed as well as risks of bleeding, infection, recurrence, injury to surrounding structures, chronic pain, recurrence, anesthesia complications cardiac/respiratory/thrombotic.  Understands wishes to proceed    Dayanara Ely MD

## 2024-05-14 ENCOUNTER — APPOINTMENT (OUTPATIENT)
Dept: PRIMARY CARE | Facility: CLINIC | Age: 54
End: 2024-05-14
Payer: COMMERCIAL

## 2024-05-21 ASSESSMENT — ENCOUNTER SYMPTOMS
CARDIOVASCULAR NEGATIVE: 1
NEUROLOGICAL NEGATIVE: 1
MUSCULOSKELETAL NEGATIVE: 1
HEMATOLOGIC/LYMPHATIC NEGATIVE: 1
GASTROINTESTINAL NEGATIVE: 1
ENDOCRINE NEGATIVE: 1
EYES NEGATIVE: 1
ALLERGIC/IMMUNOLOGIC NEGATIVE: 1
RESPIRATORY NEGATIVE: 1
PSYCHIATRIC NEGATIVE: 1
CONSTITUTIONAL NEGATIVE: 1

## 2024-07-26 ENCOUNTER — LAB (OUTPATIENT)
Dept: LAB | Facility: HOSPITAL | Age: 54
End: 2024-07-26
Payer: COMMERCIAL

## 2024-07-26 ENCOUNTER — HOSPITAL ENCOUNTER (OUTPATIENT)
Dept: CARDIOLOGY | Facility: HOSPITAL | Age: 54
Discharge: HOME | End: 2024-07-26
Payer: COMMERCIAL

## 2024-07-26 DIAGNOSIS — K40.90 INGUINAL HERNIA, LEFT: ICD-10-CM

## 2024-07-26 LAB
ANION GAP SERPL CALC-SCNC: 11 MMOL/L (ref 10–20)
ATRIAL RATE: 94 BPM
BUN SERPL-MCNC: 14 MG/DL (ref 6–23)
CALCIUM SERPL-MCNC: 10.1 MG/DL (ref 8.6–10.3)
CHLORIDE SERPL-SCNC: 103 MMOL/L (ref 98–107)
CO2 SERPL-SCNC: 30 MMOL/L (ref 21–32)
CREAT SERPL-MCNC: 0.64 MG/DL (ref 0.5–1.05)
EGFRCR SERPLBLD CKD-EPI 2021: >90 ML/MIN/1.73M*2
ERYTHROCYTE [DISTWIDTH] IN BLOOD BY AUTOMATED COUNT: 12 % (ref 11.5–14.5)
GLUCOSE SERPL-MCNC: 112 MG/DL (ref 74–99)
HCT VFR BLD AUTO: 41.9 % (ref 36–46)
HGB BLD-MCNC: 14.5 G/DL (ref 12–16)
MCH RBC QN AUTO: 32.1 PG (ref 26–34)
MCHC RBC AUTO-ENTMCNC: 34.6 G/DL (ref 32–36)
MCV RBC AUTO: 93 FL (ref 80–100)
NRBC BLD-RTO: 0 /100 WBCS (ref 0–0)
P AXIS: 79 DEGREES
P OFFSET: 204 MS
P ONSET: 147 MS
PLATELET # BLD AUTO: 172 X10*3/UL (ref 150–450)
POTASSIUM SERPL-SCNC: 3.9 MMOL/L (ref 3.5–5.3)
PR INTERVAL: 144 MS
Q ONSET: 219 MS
QRS COUNT: 16 BEATS
QRS DURATION: 94 MS
QT INTERVAL: 370 MS
QTC CALCULATION(BAZETT): 462 MS
QTC FREDERICIA: 429 MS
R AXIS: 44 DEGREES
RBC # BLD AUTO: 4.52 X10*6/UL (ref 4–5.2)
SODIUM SERPL-SCNC: 140 MMOL/L (ref 136–145)
T AXIS: 57 DEGREES
T OFFSET: 404 MS
VENTRICULAR RATE: 94 BPM
WBC # BLD AUTO: 6.9 X10*3/UL (ref 4.4–11.3)

## 2024-07-26 PROCEDURE — 80048 BASIC METABOLIC PNL TOTAL CA: CPT

## 2024-07-26 PROCEDURE — 85027 COMPLETE CBC AUTOMATED: CPT

## 2024-07-26 PROCEDURE — 93005 ELECTROCARDIOGRAM TRACING: CPT

## 2024-07-26 PROCEDURE — 36415 COLL VENOUS BLD VENIPUNCTURE: CPT

## 2024-07-29 LAB
ATRIAL RATE: 94 BPM
P AXIS: 79 DEGREES
P OFFSET: 204 MS
P ONSET: 147 MS
PR INTERVAL: 144 MS
Q ONSET: 219 MS
QRS COUNT: 16 BEATS
QRS DURATION: 94 MS
QT INTERVAL: 370 MS
QTC CALCULATION(BAZETT): 462 MS
QTC FREDERICIA: 429 MS
R AXIS: 44 DEGREES
T AXIS: 57 DEGREES
T OFFSET: 404 MS
VENTRICULAR RATE: 94 BPM

## 2024-09-12 ENCOUNTER — HOSPITAL ENCOUNTER (OUTPATIENT)
Facility: HOSPITAL | Age: 54
Setting detail: OUTPATIENT SURGERY
Discharge: HOME | End: 2024-09-12
Attending: SURGERY | Admitting: SURGERY
Payer: COMMERCIAL

## 2024-09-12 ENCOUNTER — ANESTHESIA EVENT (OUTPATIENT)
Dept: OPERATING ROOM | Facility: HOSPITAL | Age: 54
End: 2024-09-12
Payer: COMMERCIAL

## 2024-09-12 ENCOUNTER — ANESTHESIA (OUTPATIENT)
Dept: OPERATING ROOM | Facility: HOSPITAL | Age: 54
End: 2024-09-12
Payer: COMMERCIAL

## 2024-09-12 VITALS
TEMPERATURE: 96.8 F | WEIGHT: 141.31 LBS | OXYGEN SATURATION: 100 % | HEIGHT: 69 IN | DIASTOLIC BLOOD PRESSURE: 60 MMHG | SYSTOLIC BLOOD PRESSURE: 153 MMHG | BODY MASS INDEX: 20.93 KG/M2 | HEART RATE: 70 BPM | RESPIRATION RATE: 18 BRPM

## 2024-09-12 DIAGNOSIS — K40.90 INGUINAL HERNIA, LEFT: Primary | ICD-10-CM

## 2024-09-12 LAB — B-HCG SERPL-ACNC: <2 MIU/ML

## 2024-09-12 PROCEDURE — 3700000001 HC GENERAL ANESTHESIA TIME - INITIAL BASE CHARGE: Performed by: SURGERY

## 2024-09-12 PROCEDURE — 3600000008 HC OR TIME - EACH INCREMENTAL 1 MINUTE - PROCEDURE LEVEL THREE: Performed by: SURGERY

## 2024-09-12 PROCEDURE — 84702 CHORIONIC GONADOTROPIN TEST: CPT | Performed by: SURGERY

## 2024-09-12 PROCEDURE — 2720000007 HC OR 272 NO HCPCS: Performed by: SURGERY

## 2024-09-12 PROCEDURE — 2500000004 HC RX 250 GENERAL PHARMACY W/ HCPCS (ALT 636 FOR OP/ED): Mod: JZ | Performed by: SURGERY

## 2024-09-12 PROCEDURE — 36415 COLL VENOUS BLD VENIPUNCTURE: CPT | Performed by: SURGERY

## 2024-09-12 PROCEDURE — 49650 LAP ING HERNIA REPAIR INIT: CPT | Performed by: SURGERY

## 2024-09-12 PROCEDURE — 7100000002 HC RECOVERY ROOM TIME - EACH INCREMENTAL 1 MINUTE: Performed by: SURGERY

## 2024-09-12 PROCEDURE — 7100000001 HC RECOVERY ROOM TIME - INITIAL BASE CHARGE: Performed by: SURGERY

## 2024-09-12 PROCEDURE — 7100000010 HC PHASE TWO TIME - EACH INCREMENTAL 1 MINUTE: Performed by: SURGERY

## 2024-09-12 PROCEDURE — 2500000005 HC RX 250 GENERAL PHARMACY W/O HCPCS: Performed by: SURGERY

## 2024-09-12 PROCEDURE — 2780000003 HC OR 278 NO HCPCS: Performed by: SURGERY

## 2024-09-12 PROCEDURE — 3700000002 HC GENERAL ANESTHESIA TIME - EACH INCREMENTAL 1 MINUTE: Performed by: SURGERY

## 2024-09-12 PROCEDURE — 2500000004 HC RX 250 GENERAL PHARMACY W/ HCPCS (ALT 636 FOR OP/ED): Performed by: STUDENT IN AN ORGANIZED HEALTH CARE EDUCATION/TRAINING PROGRAM

## 2024-09-12 PROCEDURE — 2500000005 HC RX 250 GENERAL PHARMACY W/O HCPCS: Performed by: ANESTHESIOLOGY

## 2024-09-12 PROCEDURE — 2500000004 HC RX 250 GENERAL PHARMACY W/ HCPCS (ALT 636 FOR OP/ED): Performed by: SURGERY

## 2024-09-12 PROCEDURE — 2500000004 HC RX 250 GENERAL PHARMACY W/ HCPCS (ALT 636 FOR OP/ED): Performed by: ANESTHESIOLOGY

## 2024-09-12 PROCEDURE — 3600000003 HC OR TIME - INITIAL BASE CHARGE - PROCEDURE LEVEL THREE: Performed by: SURGERY

## 2024-09-12 PROCEDURE — 7100000009 HC PHASE TWO TIME - INITIAL BASE CHARGE: Performed by: SURGERY

## 2024-09-12 PROCEDURE — 2500000001 HC RX 250 WO HCPCS SELF ADMINISTERED DRUGS (ALT 637 FOR MEDICARE OP): Performed by: STUDENT IN AN ORGANIZED HEALTH CARE EDUCATION/TRAINING PROGRAM

## 2024-09-12 PROCEDURE — C1781 MESH (IMPLANTABLE): HCPCS | Performed by: SURGERY

## 2024-09-12 DEVICE — 3DMAX LIGHT MESH, LEFT MEDIUM
Type: IMPLANTABLE DEVICE | Site: INGUINAL | Status: FUNCTIONAL
Brand: 3DMAX LIGHT MESH

## 2024-09-12 RX ORDER — FENTANYL CITRATE 50 UG/ML
25 INJECTION, SOLUTION INTRAMUSCULAR; INTRAVENOUS EVERY 5 MIN PRN
Status: DISCONTINUED | OUTPATIENT
Start: 2024-09-12 | End: 2024-09-12 | Stop reason: HOSPADM

## 2024-09-12 RX ORDER — GLYCOPYRROLATE 0.2 MG/ML
INJECTION INTRAMUSCULAR; INTRAVENOUS AS NEEDED
Status: DISCONTINUED | OUTPATIENT
Start: 2024-09-12 | End: 2024-09-12

## 2024-09-12 RX ORDER — PROPOFOL 10 MG/ML
INJECTION, EMULSION INTRAVENOUS AS NEEDED
Status: DISCONTINUED | OUTPATIENT
Start: 2024-09-12 | End: 2024-09-12

## 2024-09-12 RX ORDER — ONDANSETRON HYDROCHLORIDE 2 MG/ML
4 INJECTION, SOLUTION INTRAVENOUS ONCE AS NEEDED
Status: DISCONTINUED | OUTPATIENT
Start: 2024-09-12 | End: 2024-09-12 | Stop reason: HOSPADM

## 2024-09-12 RX ORDER — KETOROLAC TROMETHAMINE 30 MG/ML
INJECTION, SOLUTION INTRAMUSCULAR; INTRAVENOUS AS NEEDED
Status: DISCONTINUED | OUTPATIENT
Start: 2024-09-12 | End: 2024-09-12

## 2024-09-12 RX ORDER — SODIUM CHLORIDE, SODIUM LACTATE, POTASSIUM CHLORIDE, CALCIUM CHLORIDE 600; 310; 30; 20 MG/100ML; MG/100ML; MG/100ML; MG/100ML
100 INJECTION, SOLUTION INTRAVENOUS CONTINUOUS
Status: DISCONTINUED | OUTPATIENT
Start: 2024-09-12 | End: 2024-09-12 | Stop reason: HOSPADM

## 2024-09-12 RX ORDER — LABETALOL HYDROCHLORIDE 5 MG/ML
5 INJECTION, SOLUTION INTRAVENOUS ONCE AS NEEDED
Status: DISCONTINUED | OUTPATIENT
Start: 2024-09-12 | End: 2024-09-12 | Stop reason: HOSPADM

## 2024-09-12 RX ORDER — CEFAZOLIN SODIUM 2 G/100ML
2 INJECTION, SOLUTION INTRAVENOUS ONCE
Status: COMPLETED | OUTPATIENT
Start: 2024-09-12 | End: 2024-09-12

## 2024-09-12 RX ORDER — OXYCODONE HYDROCHLORIDE 5 MG/1
5 TABLET ORAL EVERY 4 HOURS PRN
Status: DISCONTINUED | OUTPATIENT
Start: 2024-09-12 | End: 2024-09-12 | Stop reason: HOSPADM

## 2024-09-12 RX ORDER — ONDANSETRON HYDROCHLORIDE 2 MG/ML
INJECTION, SOLUTION INTRAVENOUS AS NEEDED
Status: DISCONTINUED | OUTPATIENT
Start: 2024-09-12 | End: 2024-09-12

## 2024-09-12 RX ORDER — MIDAZOLAM HYDROCHLORIDE 1 MG/ML
INJECTION, SOLUTION INTRAMUSCULAR; INTRAVENOUS AS NEEDED
Status: DISCONTINUED | OUTPATIENT
Start: 2024-09-12 | End: 2024-09-12

## 2024-09-12 RX ORDER — SODIUM CHLORIDE 0.9 G/100ML
IRRIGANT IRRIGATION AS NEEDED
Status: DISCONTINUED | OUTPATIENT
Start: 2024-09-12 | End: 2024-09-12 | Stop reason: HOSPADM

## 2024-09-12 RX ORDER — HYDROMORPHONE HYDROCHLORIDE 1 MG/ML
INJECTION, SOLUTION INTRAMUSCULAR; INTRAVENOUS; SUBCUTANEOUS AS NEEDED
Status: DISCONTINUED | OUTPATIENT
Start: 2024-09-12 | End: 2024-09-12

## 2024-09-12 RX ORDER — BUPIVACAINE HCL/EPINEPHRINE 0.25-.0005
VIAL (ML) INJECTION AS NEEDED
Status: DISCONTINUED | OUTPATIENT
Start: 2024-09-12 | End: 2024-09-12 | Stop reason: HOSPADM

## 2024-09-12 RX ORDER — OXYCODONE AND ACETAMINOPHEN 5; 325 MG/1; MG/1
1 TABLET ORAL EVERY 6 HOURS PRN
Qty: 12 TABLET | Refills: 0 | Status: SHIPPED | OUTPATIENT
Start: 2024-09-12 | End: 2024-09-17

## 2024-09-12 RX ORDER — LIDOCAINE HYDROCHLORIDE 20 MG/ML
INJECTION, SOLUTION INFILTRATION; PERINEURAL AS NEEDED
Status: DISCONTINUED | OUTPATIENT
Start: 2024-09-12 | End: 2024-09-12

## 2024-09-12 RX ORDER — ROCURONIUM BROMIDE 10 MG/ML
INJECTION, SOLUTION INTRAVENOUS AS NEEDED
Status: DISCONTINUED | OUTPATIENT
Start: 2024-09-12 | End: 2024-09-12

## 2024-09-12 RX ORDER — FENTANYL CITRATE 50 UG/ML
INJECTION, SOLUTION INTRAMUSCULAR; INTRAVENOUS AS NEEDED
Status: DISCONTINUED | OUTPATIENT
Start: 2024-09-12 | End: 2024-09-12

## 2024-09-12 RX ADMIN — OXYCODONE HYDROCHLORIDE 5 MG: 5 TABLET ORAL at 13:13

## 2024-09-12 RX ADMIN — FENTANYL CITRATE 25 MCG: 50 INJECTION, SOLUTION INTRAMUSCULAR; INTRAVENOUS at 12:35

## 2024-09-12 RX ADMIN — SODIUM CHLORIDE, POTASSIUM CHLORIDE, SODIUM LACTATE AND CALCIUM CHLORIDE 100 ML/HR: 600; 310; 30; 20 INJECTION, SOLUTION INTRAVENOUS at 08:50

## 2024-09-12 RX ADMIN — HYDROMORPHONE HYDROCHLORIDE 0.5 MG: 1 INJECTION, SOLUTION INTRAMUSCULAR; INTRAVENOUS; SUBCUTANEOUS at 12:13

## 2024-09-12 RX ADMIN — HYDROMORPHONE HYDROCHLORIDE 0.5 MG: 1 INJECTION, SOLUTION INTRAMUSCULAR; INTRAVENOUS; SUBCUTANEOUS at 12:20

## 2024-09-12 RX ADMIN — HYDROMORPHONE HYDROCHLORIDE 0.5 MG: 1 INJECTION, SOLUTION INTRAMUSCULAR; INTRAVENOUS; SUBCUTANEOUS at 12:30

## 2024-09-12 RX ADMIN — FENTANYL CITRATE 25 MCG: 50 INJECTION, SOLUTION INTRAMUSCULAR; INTRAVENOUS at 12:40

## 2024-09-12 SDOH — HEALTH STABILITY: MENTAL HEALTH: CURRENT SMOKER: 1

## 2024-09-12 ASSESSMENT — ENCOUNTER SYMPTOMS
CARDIOVASCULAR NEGATIVE: 1
EYES NEGATIVE: 1
CONSTITUTIONAL NEGATIVE: 1
MUSCULOSKELETAL NEGATIVE: 1
NEUROLOGICAL NEGATIVE: 1
PSYCHIATRIC NEGATIVE: 1
ENDOCRINE NEGATIVE: 1
RESPIRATORY NEGATIVE: 1
HEMATOLOGIC/LYMPHATIC NEGATIVE: 1
ALLERGIC/IMMUNOLOGIC NEGATIVE: 1
GASTROINTESTINAL NEGATIVE: 1

## 2024-09-12 ASSESSMENT — PAIN - FUNCTIONAL ASSESSMENT
PAIN_FUNCTIONAL_ASSESSMENT: 0-10

## 2024-09-12 ASSESSMENT — PAIN DESCRIPTION - DESCRIPTORS
DESCRIPTORS: ACHING;SORE
DESCRIPTORS: SHARP
DESCRIPTORS: ACHING
DESCRIPTORS: ACHING;SORE
DESCRIPTORS: ACHING;SORE
DESCRIPTORS: SORE
DESCRIPTORS: ACHING;SORE

## 2024-09-12 ASSESSMENT — PAIN SCALES - GENERAL
PAINLEVEL_OUTOF10: 5 - MODERATE PAIN
PAINLEVEL_OUTOF10: 5 - MODERATE PAIN
PAINLEVEL_OUTOF10: 4
PAINLEVEL_OUTOF10: 7
PAINLEVEL_OUTOF10: 3
PAINLEVEL_OUTOF10: 5 - MODERATE PAIN
PAINLEVEL_OUTOF10: 5 - MODERATE PAIN
PAINLEVEL_OUTOF10: 2
PAINLEVEL_OUTOF10: 2
PAINLEVEL_OUTOF10: 5 - MODERATE PAIN

## 2024-09-12 NOTE — ANESTHESIA POSTPROCEDURE EVALUATION
Patient: Sandra Dutton    Procedure Summary       Date: 09/12/24 Room / Location: [facility] / [facility]    Anesthesia Start: 0956 Anesthesia Stop: 1159    Procedure: Repair Inguinal Hernia Laparoscopy WITH MESH (Left) Diagnosis:       Inguinal hernia, left      (Inguinal hernia, left [K40.90])    Surgeons: Dayanara Ely MD Responsible Provider: Arron Edgar MD    Anesthesia Type: general ASA Status: 2            Anesthesia Type: general    Vitals Value Taken Time   /75 09/12/24 1155   Temp 36.2 09/12/24 1159   Pulse 70 09/12/24 1158   Resp 10 09/12/24 1158   SpO2 100 % 09/12/24 1158   Vitals shown include unfiled device data.    Anesthesia Post Evaluation    Patient location during evaluation: PACU  Patient participation: complete - patient participated  Level of consciousness: sleepy but conscious  Pain management: adequate  Airway patency: patent  Cardiovascular status: acceptable, hemodynamically stable and blood pressure returned to baseline  Respiratory status: acceptable, nonlabored ventilation, spontaneous ventilation, face mask and unassisted  Hydration status: acceptable  Postoperative Nausea and Vomiting: none      There were no known notable events for this encounter.

## 2024-09-12 NOTE — H&P
History Of Present Illness  Sandra Dutton is a 54 y.o. female presenting with left inguinal hernia.     Past Medical History  Past Medical History:   Diagnosis Date    Arthritis     Body mass index (BMI) 29.0-29.9, adult     BMI 29.0-29.9,adult    Carpal tunnel syndrome, bilateral     COVID-19 12/22/2023    VACCINATED    Elevated blood-pressure reading, without diagnosis of hypertension     Elevated BP without diagnosis of hypertension    Encounter for immunization     Encounter for immunization    Encounter for screening for malignant neoplasm of colon     Encounter for screening fecal occult blood testing    Encounter for screening for malignant neoplasm of colon 10/29/2021    Encounter for screening colonoscopy    Hernia, inguinal     left    History of high blood pressure     no longer on meds    Irritable bowel syndrome     Overweight     Overweight    Personal history of other endocrine, nutritional and metabolic disease 10/29/2021    History of hypothyroidism    Personal history of other medical treatment 10/29/2021    History of screening mammography    Personal history of other specified conditions     History of headache    Solitary pulmonary nodule     Lung nodule    Urinary tract infection     Wears glasses        Surgical History  Past Surgical History:   Procedure Laterality Date    BREAST BIOPSY      can't remember which side    BUNIONECTOMY Bilateral 01/27/2020    Foot surgery - HARDWARE    CARPAL TUNNEL RELEASE Right 02/16/2024    COLONOSCOPY          Social History  She reports that she quit smoking about 2 years ago. Her smoking use included cigarettes. She has quit using smokeless tobacco. She reports current alcohol use of about 10.0 standard drinks of alcohol per week. She reports that she does not use drugs.    Family History  Family History   Problem Relation Name Age of Onset    No Known Problems Mother      Hypertension Father      Parkinsonism Father          Allergies  Sulfa  "(sulfonamide antibiotics)    Review of Systems   Constitutional: Negative.    HENT: Negative.     Eyes: Negative.    Respiratory: Negative.     Cardiovascular: Negative.    Gastrointestinal: Negative.    Endocrine: Negative.    Genitourinary: Negative.    Musculoskeletal: Negative.    Skin: Negative.    Allergic/Immunologic: Negative.    Neurological: Negative.    Hematological: Negative.    Psychiatric/Behavioral: Negative.          Physical Exam  Constitutional:       General: She is not in acute distress.     Appearance: Normal appearance.   HENT:      Head: Normocephalic and atraumatic.      Mouth/Throat:      Pharynx: Oropharynx is clear.   Eyes:      Conjunctiva/sclera: Conjunctivae normal.      Pupils: Pupils are equal, round, and reactive to light.   Cardiovascular:      Rate and Rhythm: Normal rate and regular rhythm.   Pulmonary:      Effort: Pulmonary effort is normal.   Abdominal:      General: Abdomen is flat. There is no distension.      Palpations: Abdomen is soft.      Hernia: A hernia is present.   Musculoskeletal:         General: Normal range of motion.   Skin:     General: Skin is warm and dry.   Neurological:      General: No focal deficit present.      Mental Status: She is alert. Mental status is at baseline.   Psychiatric:         Mood and Affect: Mood normal.         Behavior: Behavior normal.         Thought Content: Thought content normal.         Judgment: Judgment normal.          Last Recorded Vitals  Blood pressure 154/84, pulse 78, temperature 36.9 °C (98.4 °F), temperature source Temporal, resp. rate 16, height 1.753 m (5' 9\"), weight 64.1 kg (141 lb 5 oz), SpO2 98%.    Relevant Results             Assessment/Plan   Assessment & Plan  Inguinal hernia, left      Laparoscopic [facility]       I spent  minutes in the professional and overall care of this patient.      Dayanara Ely MD    "

## 2024-09-12 NOTE — OP NOTE
Repair Inguinal Hernia Laparoscopy (L) Operative Note     Date: 2024  OR Location: [facility]    Name: Sandra Dutton, : 1970, Age: 54 y.o., MRN: [id number], Sex: female    Diagnosis  Pre-op Diagnosis      * Inguinal hernia, left [K40.90] Post-op Diagnosis     * Inguinal hernia, left [K40.90]     Procedures  Repair Inguinal Hernia Laparoscopy  [id number] - [place] LAPAROSCOPY SURG RPR INITIAL INGUINAL HERNIA      Surgeons      * Dayanara Ely - Primary    Resident/Fellow/Other Assistant:  Surgeons and Role:  * No surgeons found with a matching role *    Procedure Summary  Anesthesia: Anesthesia type not filed in the log.  ASA: ASA status not filed in the log.  Anesthesia Staff: Anesthesiologist: Arron Edgar MD  CRNA: LIZBETH Hobbs-CRNA  Estimated Blood Loss: 0mL  Intra-op Medications: Administrations occurring from 0945 to 1100 on 24:  * No intraprocedure medications in log *        Specimen: No specimens collected     Staff:            Drains and/or Catheters: * None in log *    Tourniquet Times:         Implants:     Findings: See report    Indications: Sandra Dutton is an 54 y.o. female who is having surgery for Inguinal hernia, left [K40.90].     The patient was seen in the preoperative area. The risks, benefits, complications, treatment options, non-operative alternatives, expected recovery and outcomes were discussed with the patient. The possibilities of reaction to medication, pulmonary aspiration, injury to surrounding structures, bleeding, recurrent infection, the need for additional procedures, failure to diagnose a condition, and creating a complication requiring transfusion or operation were discussed with the patient. The patient concurred with the proposed plan, giving informed consent.  The site of surgery was properly noted/marked if necessary per policy. The patient has been actively warmed in preoperative area. Preoperative antibiotics given.  Venous thrombosis prophylaxis  provided.    Procedure Details: After satisfactory induction of anesthesia, patient was prepped and draped.  Local anesthesia was infiltrated.  Infraumbilical incision was made overlying the left rectus sheath and dissection carried down to fascia.  Anterior rectus sheath was sharply incised and muscle-splitting technique used to enter the preperitoneal space.  Under direct visualization, a balloon dissector was passed to the level of the pubis and inflated which was then deflated and exchanged for a Wright trocar.  Pneumo preperitoneum was established.  Under direct visualization 2 additional 5 mm trocars were placed in the lower midline.  Attention turned to the left.  Some adhesions of the peritoneum laterally on the left as well as involving the right preperitoneal space were noted.  Beginning at the symphysis, Jose Manuel's ligament was bluntly dissected free working laterally to the femoral vessels.  The lateral working space was then fully developed.  Small direct component identified.  Round ligament dissected free and a medium indirect sac was dissected free and completely reduced.  Medium 3D light weight Prolene mesh was then rolled and passed into the working space.  This was unrolled and secured along Jose Manuel's ligament medially using the tacking device with additional tacks placed in the overlying musculature superiorly.  Tail held in place.  No lateral tacks were placed.  Pneumoperitoneum was released.  Hemostasis assured.  Fascia reapproximated with interrupted figure-of-eight 0 Vicryl suture.  Incision was reapproximated with Monocryl in a subcuticular fashion.  Dressings applied.  Patient waken from anesthesia and transferred to recovery in satisfactory condition, tolerated well.  Complications:  None; patient tolerated the procedure well.    Disposition: PACU - hemodynamically stable.  Condition: stable         Additional Details:     Attending Attestation:     Dayanara Ely  Phone Number:  [phone]

## 2024-09-12 NOTE — ANESTHESIA PREPROCEDURE EVALUATION
Sandra Dutton is a 54 y.o. female here for:    Repair Inguinal Hernia Laparoscopy  With Dayanara Ely MD  Pre-Op Diagnosis Codes:      * Hernia [K40.90]    Relevant Problems   GI   (+) Irritable bowel syndrome (IBS)      Digestive   (+) Inguinal hernia, left      Tobacco   (+) Current every day smoker       Lab Results   Component Value Date    HGB 14.5 2024    HCT 41.9 2024    WBC 6.9 2024     2024     2024    K 3.9 2024     2024    CREATININE 0.64 2024    BUN 14 2024     EKG 2024:  Normal sinus rhythm  Possible Left atrial enlargement  Borderline ECG  When compared with ECG of 23-OCT-2023 13:02,  No significant change was found    Social History     Tobacco Use   Smoking Status Former    Current packs/day: 0.00    Types: Cigarettes    Quit date: 2022    Years since quittin.6   Smokeless Tobacco Former       Allergies   Allergen Reactions    Sulfa (Sulfonamide Antibiotics) Rash       Current Outpatient Medications   Medication Instructions    ALPRAZolam (XANAX) 0.25 mg, oral, 2 times daily PRN    b complex vitamins (Vitamins B Complex) capsule oral, Daily    cholecalciferol (VITAMIN D-3) 2,000 Units, oral    meloxicam (MOBIC) 15 mg, oral, Daily, Take with food.       Past Surgical History:   Procedure Laterality Date    BREAST BIOPSY      can't remember which side    BUNIONECTOMY Bilateral 2020    Foot surgery - [facility]    CARPAL TUNNEL RELEASE Right 2024    COLONOSCOPY         Family History   Problem Relation Name Age of Onset    No Known Problems Mother      Hypertension Father      Parkinsonism Father         NPO Details:  No data recorded    Physical Exam    Airway  Mallampati: II  TM distance: >3 FB     Cardiovascular    Dental - normal exam     Pulmonary    Abdominal            Anesthesia Plan    History of general anesthesia?: yes  History of complications of general anesthesia?: no    ASA 2     general      The patient is a current smoker.  Patient was previously instructed to abstain from smoking on day of procedure.  Patient did not smoke on day of procedure.    intravenous induction   Postoperative administration of opioids is intended.  Trial extubation is planned.  Anesthetic plan and risks discussed with patient.    Plan discussed with CRNA.

## 2024-09-12 NOTE — DISCHARGE INSTRUCTIONS
"Activity as tolerated with no lifting >20lbs x2 weeks  Ok to shower  Steristrips x1 weeks    Abdominal wall hernia repair    The Basics  Written by the doctors and editors at [facility]  What is an abdominal wall hernia? -- The internal organs and tissues in the belly are held in place by a tough outer wall of tissue called the \"abdominal wall.\" An abdominal hernia is an area in that wall that is weak or torn. Often, when there is a hernia, organs or tissues that are normally held in place by the abdominal wall bulge or stick out through the weak or torn spot.  There are many different kinds of abdominal wall hernias (figure 1).  What is abdominal wall hernia repair? -- This is a way to fix a hernia with surgery. Not all hernias need repair, but many do.  Abdominal wall hernia repair can be done in 2 ways:  ? Open surgery - During an open surgery, the doctor makes 1 large cut near the hernia to repair it.  ? Minimally invasive surgery - \"Minimally invasive\" surgery allows the doctor to make smaller cuts in the skin. They insert long, thin tools through the cuts. One of the tools has a camera (called a \"laparoscope\") on the end, which sends pictures to a TV screen. The doctor can look at the screen to see inside the body. Then, they use the long tools to do the surgery. They can control the tools directly, or with the help of a robot (this is called \"robot-assisted\" surgery).  You might be able to return to normal activities sooner if you had minimally invasive surgery than if you had an open surgery.  Most, but not all, hernia repairs are done using a piece of mesh. This can lower the risk of the hernia coming back. But it can also cause pain or other problems in some people. If you are concerned about hernia mesh, talk to your surgeon. Minimally invasive hernia repair must be done with mesh.  How do I prepare for abdominal wall hernia repair? -- The doctor or nurse will tell you if you need to do anything special to " "prepare.  Before your procedure, your doctor will do an exam. They might order tests, such as:  ? Lab tests  ? Electrocardiogram (\"ECG\") - This test measures the electrical activity in your heart.  ? CT scan - A CT scan is a special kind of X-ray.  Your doctor will also ask you about your \"health history.\" This involves asking you questions about any health problems you have or had in the past, past surgeries, and any medicines you take. Tell them about:  ? Any medicines you are taking - This includes any prescription or \"over-the-counter\" medicines you use, plus any herbal supplements you take. It helps to write down and bring a list of any medicines you take, or bring a bag with all of your medicines with you.  ? Any allergies you have  ? Any bleeding problems you have - Certain medicines, including some herbs and supplements, can increase the risk of bleeding. Some health conditions also increase this risk.  You will also get information about:  ? Fasting - This means not eating or drinking anything for a period of time. Whether you need to fast, and for how long, depends on the procedure you are having.  ? Lowering the risk of infection - In some cases, you might need to trim (not shave) your body hair before your procedure. You might also need to wash the area with a special soap.  ? What help you will need when you go home - For example, you might need to have someone else bring you home or stay with you for some time while you recover.  Ask the doctor or nurse if you have questions or if there is anything you do not understand.  What happens during abdominal wall hernia repair? -- When it is time for the procedure:  ? You will get an \"IV,\" which is a thin tube that goes into a vein. This can be used to give you fluids and medicines.  ? You will get general anesthesia medicines. This type of anesthesia makes you unconscious so you can't feel, see, or hear anything during the procedure. You might get a " "breathing tube to help you breathe.  ? You might get medicines to help control pain after the procedure.  ? You might get a small tube in your nose. This tube goes down to your stomach to drain out any food or fluid.  ? You might get a thin, flexible tube called a \"catheter\" in your bladder. This is to drain urine during the procedure.  ? The doctors and nurses will monitor your breathing, blood pressure, and heart rate during the procedure.  ? The doctor will make an incision (cut) near the hernia:  ? If you are having minimally invasive or robotic-assisted surgery, you will have several small incisions. The doctor will put a laparoscope with a tiny camera through 1 of the small incisions to look at the tissue that is stuck in the hernia. The doctor will put small tools through the other incisions to do the procedure. If you are having robotic-assisted surgery, the doctor will use a surgical robot to control the tools.  They will also use carbon dioxide gas to inflate your abdomen during the surgery. This makes it easier for them to see and use the tools.  ? If you are having open surgery, you will have just 1 incision.  ? The doctor will look at the tissue that is stuck in the hernia. If it is healthy, they will gently push it back into place. Sometimes, a piece of tissue needs to be removed.  ? Next, the doctor will sew the layers of the abdominal wall back together, so that nothing can bulge through. In some cases, doctors will also patch the area with a piece of mesh. The mesh takes some of the strain off of the abdominal wall, so the hernia is less likely to happen again.  ? The doctor will close your incisions and cover them with clean bandages. They will also use a numbing medicine on the incision so you have less pain.  What happens after abdominal wall hernia repair? -- After your procedure, you will be taken to a recovery room. The staff will watch you closely as your anesthesia wears off. Usually, people " "can go home the same day. But in some cases, you might need to stay overnight in the hospital.  As you recover:  ? You might feel groggy or confused for a short time. You might also feel nauseous or vomit. The doctor or nurse can give you medicine to help with this.  ? If you had a tube in your nose, it will be removed.  ? If you had a catheter in your bladder, it will be removed.  ? If you had a breathing tube, you might have a sore throat. This usually gets better quickly.  ? The staff will help you get out of bed and start moving around when you are ready.  ? You will get medicine if needed to help with pain. You might need other medicines, too.  ? When you are ready to eat, you will start with clear liquids. Then, you can start eating as you are able. You might feel better if you start with bland foods.  What are the risks of abdominal wall hernia repair? -- Your doctor will talk to you about all of the possible risks, and answer your questions. Possible risks include:  ? Infection  ? Bleeding  ? Blockage in the intestine  ? Pneumonia  ? Blood clot in the legs  ? The hernia returning  ? Fluid collection (\"seroma\" or \"hematoma\")  ? Ongoing pain where your mesh is stitched into place  ? Damage to organs, such as your bladder or intestines  ? Problems with the mesh, if you got mesh - These can include the mesh moving to the wrong place in the body, breaking down, or becoming infected. If this happens, you will need another surgery to remove the mesh.  What else should I know? -- In some cases, a hernia might come back even after surgery. You can help lower the chance of this happening if you:  ? Keep a healthy body weight.  ? Keep other health conditions, like diabetes, under control.  ? Avoid lifting heavy objects after your surgery.  All topics are updated as new evidence becomes available and our peer review process is complete.  This topic retrieved from [employer] on: May 30, 2024.  Topic 195908 Version " 3.0  Release: 32.5.3 - C32.150  © 2024 UpToDate, Inc. and/or its affiliates. All rights reserved.  figure 1: Abdominal wall hernias    Consumer Information Use and Disclaimer  Disclaimer: This generalized information is a limited summary of diagnosis, treatment, and/or medication information. It is not meant to be comprehensive and should be used as a tool to help the user understand and/or assess potential diagnostic and treatment options. It does NOT include all information about conditions, treatments, medications, side effects, or risks that may apply to a specific patient. It is not intended to be medical advice or a substitute for the medical advice, diagnosis, or treatment of a health care provider based on the health care provider's examination and assessment of a patient's specific and unique circumstances. Patients must speak with a health care provider for complete information about their health, medical questions, and treatment options, including any risks or benefits regarding use of medications. This information does not endorse any treatments or medications as safe, effective, or approved for treating a specific patient. UpToDate, Inc. and its affiliates disclaim any warranty or liability relating to this information or the use thereof.The use of this information is governed by the Terms of Use, available at https://www.woltersKadientuwer.com/en/know/clinical-effectiveness-terms. 2024© UpToDate, Inc. and its affiliates and/or licensors. All rights reserved.  © 2024 UpToDate, Inc. and/or its affiliates. All rights reserved.    General Anesthesia Discharge Instructions    About this topic  You may need general anesthesia if you need to be asleep during a procedure. Your doctor will use drugs to block the signals that go from your nerves to your brain. Doctors give general anesthesia during a surgery or procedure to:  Allow you to sleep  Help your body be still  Relax your muscles  Help you to relax and be  pain free  Keep you from remembering the surgery  Let the doctor manage your airway, breathing, and blood flow  The doctor or nurse anesthetist gives general anesthesia by a shot into your vein. Sometimes, you may breathe in a gas through a mask placed over your face.  What care is needed at home?  Ask your doctor what you need to do when you go home. Make sure you ask questions if you do not understand what the doctor says.  Your doctor may give you drugs to prevent or treat an upset stomach from the anesthetic. Take them as ordered.  If your throat is sore, suck on ice chips or popsicles to ease throat pain.  Put 2 to 3 pillows under your head and back when you lie down to help you breathe easier.  For the first 24 to 48 hours:  Do not operate heavy or dangerous machinery.  Do not make major decisions or sign important papers. You may not be able to think clearly.  Avoid beer, wine, or mixed drinks.  You are at a higher risk of falling for at least 24 hours after general anesthesia.  Take extra care when you get up.  Do not change positions quickly.  Do not rush when you need to go to the bathroom or to answer the phone.  Ask for help if you feel unsteady when you try to walk.  Wear shoes with non-slip soles and low heels.  What follow-up care is needed?  Your doctor may ask you to come back to the office to check on your progress. Be sure to keep these visits.  If you have stitches that do not dissolve or staples, you will need to have them removed. Your doctor will want to do this in 1 to 2 weeks. If the doctor used skin glue, the glue will fall off on its own.  What drugs may be needed?  The doctor may order drugs to:  Help with pain  Treat an upset stomach or throwing up  Will physical activity be limited?  You will not be allowed to drive right away after the procedure. Ask a family member or a friend to drive you home.  Avoid trying to get out of bed without help until you are sure of your balance.  You may  have to limit your activity. Talk to your doctor about if you need to limit how much you lift or limit exercise after your procedure.  What changes to diet are needed?  Start with a light diet when you are fully awake. This includes things that are easy to swallow like soups, pudding, jello, toast, and eggs. Slowly progress to your normal diet.  What problems could happen?  Low blood pressure  Breathing problems  Upset stomach or throwing up  Dizziness  Blood clots  Infection  When do I need to call the doctor?  Trouble breathing  Upset stomach or throwing up more than 3 times in the next 2 days  Dizziness  Teach Back: Helping You Understand  The Teach Back Method helps you understand the information we are giving you. After you talk with the staff, tell them in your own words what you learned. This helps to make sure the staff has described each thing clearly. It also helps to explain things that may have been confusing. Before going home, make sure you can do these:  I can tell you about my procedure.  I can tell you if I need to follow up with my doctor.  I can tell you what is good for me to eat and drink the next day.  I can tell you what I would do if I have trouble breathing, an upset stomach, or dizziness.  Where can I learn more?  [employer] [place] [employer]  https://www.nigms.nih.gov/education/pages/factsheet_Anesthesia.aspx  [facility] Choices  http://www.nhs.uk/conditions/Anaesthetic-general/Pages/Definition.aspx  Last Reviewed Date  2020-04-22

## 2024-09-12 NOTE — ANESTHESIA PROCEDURE NOTES
Airway  Date/Time: 9/12/2024 10:09 AM  Urgency: elective    Airway not difficult    Staffing  Performed: CRNA   Authorized by: Arron Edgar MD    Performed by: LIZBETH Hobbs-ALYSA  Patient location during procedure: OR    Indications and Patient Condition  Indications for airway management: anesthesia and airway protection  Spontaneous Ventilation: absent  Sedation level: deep  Preoxygenated: yes  Mask difficulty assessment: 1 - vent by mask    Final Airway Details  Final airway type: endotracheal airway      Successful airway: ETT  Cuffed: yes   Successful intubation technique: direct laryngoscopy  Facilitating devices/methods: intubating stylet  Endotracheal tube insertion site: oral  Blade size: #3  ETT size (mm): 7.0  Cormack-Lehane Classification: grade IIa - partial view of glottis  Placement verified by: chest auscultation and capnometry   Measured from: gums  ETT to gums (cm): 22  Number of attempts at approach: 1

## 2024-10-02 ENCOUNTER — APPOINTMENT (OUTPATIENT)
Dept: SURGERY | Facility: CLINIC | Age: 54
End: 2024-10-02
Payer: COMMERCIAL

## 2024-10-02 VITALS
BODY MASS INDEX: 20.73 KG/M2 | SYSTOLIC BLOOD PRESSURE: 130 MMHG | WEIGHT: 140 LBS | HEIGHT: 69 IN | DIASTOLIC BLOOD PRESSURE: 80 MMHG

## 2024-10-02 DIAGNOSIS — K40.90 INGUINAL HERNIA, LEFT: Primary | ICD-10-CM

## 2024-10-02 PROCEDURE — 99024 POSTOP FOLLOW-UP VISIT: CPT | Performed by: SURGERY

## 2024-10-02 PROCEDURE — 3008F BODY MASS INDEX DOCD: CPT | Performed by: SURGERY

## 2024-10-16 NOTE — PROGRESS NOTES
Subjective   Patient ID: Sandra Dutton is a 54 y.o. female who presents for Post-op.  HPI  Postoperative visit following laparoscopic left inguinal hernia repair.  No complaints.  Doing well in her recovery, no pain.  Resuming previous level of activity.    Review of Systems      Past Medical History:   Diagnosis Date    Arthritis     Body mass index (BMI) 29.0-29.9, adult     BMI 29.0-29.9,adult    Carpal tunnel syndrome, bilateral     COVID-19 12/22/2023    VACCINATED    Elevated blood-pressure reading, without diagnosis of hypertension     Elevated BP without diagnosis of hypertension    Encounter for immunization     Encounter for immunization    Encounter for screening for malignant neoplasm of colon     Encounter for screening fecal occult blood testing    Encounter for screening for malignant neoplasm of colon 10/29/2021    Encounter for screening colonoscopy    Hernia, inguinal     left    History of high blood pressure     no longer on meds    Irritable bowel syndrome     Overweight     Overweight    Personal history of other endocrine, nutritional and metabolic disease 10/29/2021    History of hypothyroidism    Personal history of other medical treatment 10/29/2021    History of screening mammography    Personal history of other specified conditions     History of headache    Solitary pulmonary nodule     Lung nodule    Urinary tract infection     Wears glasses      Past Surgical History:   Procedure Laterality Date    BREAST BIOPSY      can't remember which side    BUNIONECTOMY Bilateral 01/27/2020    Foot surgery - [facility]    CARPAL TUNNEL RELEASE Right 02/16/2024    COLONOSCOPY       Family History   Problem Relation Name Age of Onset    No Known Problems Mother      Hypertension Father      Parkinsonism Father        Social History     Socioeconomic History    Marital status: Single   Tobacco Use    Smoking status: Former     Current packs/day: 0.00     Types: Cigarettes     Quit date: 1/1/2022      Years since quittin.7    Smokeless tobacco: Former   Vaping Use    Vaping status: Never Used   Substance and Sexual Activity    Alcohol use: Yes     Alcohol/week: 10.0 standard drinks of alcohol     Types: 5 Glasses of wine, 5 Standard drinks or equivalent per week     Comment: occasional    Drug use: Never    Sexual activity: Yes     Partners: Male     Social Drivers of Health     Financial Resource Strain: Low Risk  (3/13/2023)    Received from [facility]    Overall Financial Resource Strain (CARDIA)     Difficulty of Paying Living Expenses: Not hard at all   Food Insecurity: No Food Insecurity (3/13/2023)    Received from [facility]    Hunger Vital Sign     Worried About Running Out of Food in the Last Year: Never true     Ran Out of Food in the Last Year: Never true   Transportation Needs: Unknown (3/13/2023)    Received from [facility]    PRAPARE - Transportation     Lack of Transportation (Non-Medical): No   Housing Stability: Unknown (3/13/2023)    Received from [facility]    Housing Stability Vital Sign     Unstable Housing in the Last Year: No          Current Outpatient Medications:     ALPRAZolam (Xanax) 0.25 mg tablet, Take 1 tablet (0.25 mg) by mouth 2 times a day as needed., Disp: , Rfl:     b complex vitamins (Vitamins B Complex) capsule, Take by mouth once daily., Disp: , Rfl:     cholecalciferol (Vitamin D-3) 50 mcg (2,000 unit) capsule, Take 1 capsule (50 mcg) by mouth., Disp: , Rfl:     meloxicam (Mobic) 15 mg tablet, Take 1 tablet (15 mg) by mouth once daily. Take with food., Disp: , Rfl:      Objective   Vitals:    10/02/24 1505   BP: 130/80      Physical Exam  No acute distress.  Incision well-healed without hernia recurrence.  Abdomen soft, nontender  nondistended    Assessment/Plan   Problem List Items Addressed This Visit             ICD-10-CM    Inguinal hernia, left - Primary K40.90   Overall doing well from my standpoint.  Resume previous level of activity as tolerated.  Follow-up as needed.      Dayanara Ely MD

## 2024-11-05 NOTE — PREPROCEDURE INSTRUCTIONS
NPO Instructions:  Do not eat any food after midnight the night before your surgery/procedure.    Additional Instructions:                                                      Patient called to advise she went to Urgent care and has ammonia and is taking antibiotics and wants to know if she needs to be seen by the provider, please advise

## 2025-01-02 ENCOUNTER — HOSPITAL ENCOUNTER (OUTPATIENT)
Dept: RADIOLOGY | Facility: CLINIC | Age: 55
Discharge: HOME | End: 2025-01-02
Payer: COMMERCIAL

## 2025-01-02 ENCOUNTER — OFFICE VISIT (OUTPATIENT)
Dept: ORTHOPEDIC SURGERY | Facility: CLINIC | Age: 55
End: 2025-01-02
Payer: COMMERCIAL

## 2025-01-02 VITALS — BODY MASS INDEX: 20.73 KG/M2 | HEIGHT: 69 IN | WEIGHT: 140 LBS

## 2025-01-02 DIAGNOSIS — M25.551 PAIN OF RIGHT HIP: ICD-10-CM

## 2025-01-02 DIAGNOSIS — M70.61 TROCHANTERIC BURSITIS OF RIGHT HIP: Primary | ICD-10-CM

## 2025-01-02 DIAGNOSIS — S70.01XA CONTUSION OF RIGHT HIP REGION: ICD-10-CM

## 2025-01-02 PROCEDURE — 73502 X-RAY EXAM HIP UNI 2-3 VIEWS: CPT | Mod: RIGHT SIDE | Performed by: INTERNAL MEDICINE

## 2025-01-02 PROCEDURE — 1036F TOBACCO NON-USER: CPT | Performed by: INTERNAL MEDICINE

## 2025-01-02 PROCEDURE — 73502 X-RAY EXAM HIP UNI 2-3 VIEWS: CPT | Mod: RT

## 2025-01-02 PROCEDURE — 99213 OFFICE O/P EST LOW 20 MIN: CPT | Performed by: INTERNAL MEDICINE

## 2025-01-02 PROCEDURE — 3008F BODY MASS INDEX DOCD: CPT | Performed by: INTERNAL MEDICINE

## 2025-01-02 PROCEDURE — 99203 OFFICE O/P NEW LOW 30 MIN: CPT | Performed by: INTERNAL MEDICINE

## 2025-01-02 RX ORDER — METHYLPREDNISOLONE 4 MG/1
TABLET ORAL
Qty: 21 TABLET | Refills: 0 | Status: SHIPPED | OUTPATIENT
Start: 2025-01-02

## 2025-01-02 NOTE — PROGRESS NOTES
Acute Injury New Patient Visit    CC:   Chief Complaint   Patient presents with    Right Hip - Pain     Fell the second weekend in December       HPI: Sandra is a 54 y.o. female presents today for evaluation for acute right hip injury sustained about two weeks ago after she fell on ice. She denies any tingling, numbness, or limping. She is here for initial evaluation and x-rays.         Review of Systems   GENERAL: Negative for malaise, significant weight loss, fever  MUSCULOSKELETAL: See HPI  NEURO:  Negative for numbness / tingling     Past Medical History  Past Medical History:   Diagnosis Date    Arthritis     Body mass index (BMI) 29.0-29.9, adult     BMI 29.0-29.9,adult    Carpal tunnel syndrome, bilateral     COVID-19 12/22/2023    VACCINATED    Elevated blood-pressure reading, without diagnosis of hypertension     Elevated BP without diagnosis of hypertension    Encounter for immunization     Encounter for immunization    Encounter for screening for malignant neoplasm of colon     Encounter for screening fecal occult blood testing    Encounter for screening for malignant neoplasm of colon 10/29/2021    Encounter for screening colonoscopy    Hernia, inguinal     left    History of high blood pressure     no longer on meds    Irritable bowel syndrome     Overweight     Overweight    Personal history of other endocrine, nutritional and metabolic disease 10/29/2021    History of hypothyroidism    Personal history of other medical treatment 10/29/2021    History of screening mammography    Personal history of other specified conditions     History of headache    Solitary pulmonary nodule     Lung nodule    Urinary tract infection     Wears glasses        Medication review  Medication Documentation Review Audit       Reviewed by Ella Hope MA (Medical Assistant) on 01/02/25 at 1500      Medication Order Taking? Sig Documenting Provider Last Dose Status   ALPRAZolam (Xanax) 0.25 mg tablet [id number] No Take 1  tablet (0.25 mg) by mouth 2 times a day as needed. Historical Provider, MD Not Taking Active   b complex vitamins (Vitamins B Complex) capsule [id number] No Take by mouth once daily. Historical Provider, MD Past Week Active   cholecalciferol (Vitamin D-3) 50 mcg (2,000 unit) capsule [id number] No Take 1 capsule (50 mcg) by mouth. Historical Provider, MD Past Week Active   meloxicam (Mobic) 15 mg tablet [id number] No Take 1 tablet (15 mg) by mouth once daily. Take with food. Historical Provider, MD Past Month Active                    Allergies  Allergies   Allergen Reactions    Sulfa (Sulfonamide Antibiotics) Rash       Social History  Social History     Socioeconomic History    Marital status: Single     Spouse name: Not on file    Number of children: Not on file    Years of education: Not on file    Highest education level: Not on file   Occupational History    Not on file   Tobacco Use    Smoking status: Former     Current packs/day: 0.00     Types: Cigarettes     Quit date: 1/1/2022     Years since quitting: 3.0    Smokeless tobacco: Former   Vaping Use    Vaping status: Never Used   Substance and Sexual Activity    Alcohol use: Yes     Alcohol/week: 10.0 standard drinks of alcohol     Types: 5 Glasses of wine, 5 Standard drinks or equivalent per week     Comment: occasional    Drug use: Never    Sexual activity: Yes     Partners: Male   Other Topics Concern    Not on file   Social History Narrative    Not on file     Social Drivers of Health     Financial Resource Strain: Low Risk  (3/13/2023)    Received from [facility]    Overall Financial Resource Strain (CARDIA)     Difficulty of Paying Living Expenses: Not hard at all   Food Insecurity: No Food Insecurity (3/13/2023)    Received from [facility]    Hunger Vital Sign     Worried About Running Out of Food in the Last Year: Never true     Ran Out of  Food in the Last Year: Never true   Transportation Needs: Unknown (3/13/2023)    Received from [facility]    PRAPARE - Transportation     Lack of Transportation (Medical): Not on file     Lack of Transportation (Non-Medical): No   Physical Activity: Not on file   Stress: Not on file   Social Connections: Not on file   Intimate Partner Violence: Not on file   Housing Stability: Unknown (3/13/2023)    Received from [facility]    Housing Stability Vital Sign     Unable to Pay for Housing in the Last Year: Not on file     Number of Places Lived in the Last Year: Not on file     Unstable Housing in the Last Year: No       Surgical History  Past Surgical History:   Procedure Laterality Date    BREAST BIOPSY      can't remember which side    BUNIONECTOMY Bilateral 01/27/2020    Foot surgery - [facility]    CARPAL TUNNEL RELEASE Right 02/16/2024    COLONOSCOPY         Physical Exam:  GENERAL:  Patient is awake, alert, and oriented to person place and time.  Patient appears well nourished and well kept.  Affect Calm, Not Acutely Distressed.  HEENT:  Normocephalic, Atraumatic, EOMI  CARDIOVASCULAR:  Hemodynamically stable.  RESPIRATORY:  Normal respirations with unlabored breathing.  Extremity: Right hip skin is intact.  There is no erythema or warmth.  There is no clinical signs of infection.  No hematoma.  She can flex the right hip to 90 degrees.  No pain with internal or external rotation.  Pain over the trochanteric bursa.  Mild pain over the IT band.  Mild pain over the IT band at its origin of the iliac crest.  Quadricep strength is 5/5 on the right, similar to 5/5 on left.  Negative straight leg test on the right leg or left leg.  She is able to get up from her chair with no difficulties.  Walking with no significant antalgic gait.  Left hip was examined for comparison.      Diagnostics: X-rays  reviewed  ECG 12 lead  Normal sinus rhythm  Possible Left atrial enlargement  Borderline ECG  When compared with ECG of 23-OCT-2023 13:02,  No significant change was found  Confirmed by Kervin Arciniega ([id number]) on 7/29/2024 7:02:58 AM      Procedure: None    Assessment:   Right posttraumatic trochanteric bursitis  Right hip contusion    Plan: Sandra presents today for initial evaluation for acute right hip injury sustained about two weeks ago after a fall on ice. X-rays showed no obvious fractures. We recommended physical therapy and a Medrol dose Morgan for the acute inflammation. She will follow-up in 2-3 weeks for reevaluation.  If no improvement, may consider possible corticosteroid injection to the right trochanteric bursa or further advanced imaging.    Orders Placed This Encounter    XR hip right with pelvis when performed 2 or 3 views      At the conclusion of the visit there were no further questions by the patient/family regarding their plan of care.  Patient was instructed to call or return with any issues, questions, or concerns regarding their injury and/or treatment plan described above.     01/02/25 at 3:16 PM - Kinga De Oliveira MD  Scribe Attestation  By signing my name below, I, Severiano Cintia, Scribe   attest that this documentation has been prepared under the direction and in the presence of Kinga De Oliveira MD.    Office: [phone]    This note was prepared using voice recognition software.  The details of this note are correct and have been reviewed, and corrected to the best of my ability.  Some grammatical errors may persist related to the Dragon software.

## 2025-01-07 ENCOUNTER — APPOINTMENT (OUTPATIENT)
Dept: PHYSICAL THERAPY | Facility: HOSPITAL | Age: 55
End: 2025-01-07
Payer: COMMERCIAL

## 2025-04-02 ENCOUNTER — OFFICE VISIT (OUTPATIENT)
Dept: ORTHOPEDIC SURGERY | Facility: CLINIC | Age: 55
End: 2025-04-02
Payer: COMMERCIAL

## 2025-04-02 DIAGNOSIS — S32.009A LUMBAR TRANSVERSE PROCESS FRACTURE, CLOSED, INITIAL ENCOUNTER (MULTI): Primary | ICD-10-CM

## 2025-04-02 DIAGNOSIS — M54.50 ACUTE LOW BACK PAIN, UNSPECIFIED BACK PAIN LATERALITY, UNSPECIFIED WHETHER SCIATICA PRESENT: ICD-10-CM

## 2025-04-02 PROCEDURE — 99214 OFFICE O/P EST MOD 30 MIN: CPT | Performed by: PHYSICIAN ASSISTANT

## 2025-04-02 RX ORDER — HYDROCODONE BITARTRATE AND ACETAMINOPHEN 5; 325 MG/1; MG/1
1 TABLET ORAL EVERY 6 HOURS PRN
Qty: 28 TABLET | Refills: 0 | Status: SHIPPED | OUTPATIENT
Start: 2025-04-02 | End: 2025-04-09

## 2025-04-02 NOTE — PROGRESS NOTES
Sandra Dutton is a 54 y.o. female who presents for Pain of the Lower Back.    HPI:  54-year-old female here for new patient evaluation of low back pain.  She denies any fever chills nausea vomiting night sweats.  She has no bowel or bladder complaints.    Physical exam:  Well-nourished, well kept.No lymphangitis or lymphadenopathy in the examined extremities.  Gait normal.  Can stand on heels and toes.   Examination of the back shows tenderness in the paraspinous musculature off to the right in the mid lumbar area.  There is decreased range of motion in all directions due to guarding/muscle spasms and pain at extremes.  There is good strength and no instability.  Examination of the lower extremities reveals no point tenderness, swelling, or deformity.  Range of motion of the hips, knees, and ankles are full without crepitance, instability, or exacerbation of pain.  Strength is 5/5 throughout.  No redness, abrasions, or lesions on extremities  Gross sensation intact in the extremities.   Affect normal.  Alert and oriented ×3.  Coordination normal.    Imaging studies:  We have reviewed reports of x-rays and CT scan of the lumbar spine from the [facility].  We do not have access to these images.  We are ordering an MRI today.    Assessment:  61-year-old female here for new patient evaluation of low back pain.  She had a fall several days ago and started having right sided thoracolumbar pain.  She does not have any radiating pain.  She went to the emergency department and x-rays were done and a CAT scan were done.  It showed the patient has right sided transverse process fractures at L1-L2-L3 and L4.  She was given some steroid and a few doses of pain medication.  She is out of network at the [facility] and she did not realize that.  The cost of the CAT scan and x-rays were very financially difficult for her, she did not want to get new images today at this visit.  She said if she could wait a couple of  weeks she would be able to get new x-rays.  No history of back surgery, she does not normally have chronic back pain.    Plan:  We can get her some pain medication to help with her sleep at night, we will get an MRI of her lumbar spine to help further diagnose these fractures, and we will get her a back brace to help support her lumbar spine because of these fractures.  This lumbar support brace is medically necessary and relevant to provide safety and comfort for the patient and aid in the healing process.  She will get x-rays of the lumbar spine when she comes in for follow-up of her MRI.    We have reviewed reports, we have ordered MRI.  I have spoken personally with Dr. Marilynn garcía about this patient.  This is an acute undiagnosed new problem with uncertain prognosis that is severely inhibiting her bodily function.

## 2025-04-03 ENCOUNTER — TELEPHONE (OUTPATIENT)
Dept: ORTHOPEDIC SURGERY | Facility: CLINIC | Age: 55
End: 2025-04-03
Payer: COMMERCIAL

## 2025-04-11 ENCOUNTER — TELEPHONE (OUTPATIENT)
Dept: ORTHOPEDIC SURGERY | Facility: CLINIC | Age: 55
End: 2025-04-11
Payer: COMMERCIAL

## 2025-04-11 NOTE — TELEPHONE ENCOUNTER
4/11/25 - Lumbar brace - per email from [facility] on 4/7/25.  Patient out of pocket cost for the lumbar brace would be $450.00 through her insurance ([employer]).  Pt declined the brace at this time due to the cost.

## 2025-04-17 ENCOUNTER — APPOINTMENT (OUTPATIENT)
Dept: RADIOLOGY | Facility: CLINIC | Age: 55
End: 2025-04-17
Payer: COMMERCIAL

## 2025-04-22 DIAGNOSIS — S32.009A LUMBAR TRANSVERSE PROCESS FRACTURE, CLOSED, INITIAL ENCOUNTER (MULTI): ICD-10-CM

## 2025-04-22 DIAGNOSIS — M54.50 ACUTE LOW BACK PAIN, UNSPECIFIED BACK PAIN LATERALITY, UNSPECIFIED WHETHER SCIATICA PRESENT: ICD-10-CM

## 2025-05-05 ENCOUNTER — APPOINTMENT (OUTPATIENT)
Dept: ORTHOPEDIC SURGERY | Facility: CLINIC | Age: 55
End: 2025-05-05
Payer: COMMERCIAL

## 2025-07-23 DIAGNOSIS — Z12.31 ENCOUNTER FOR SCREENING MAMMOGRAM FOR BREAST CANCER: ICD-10-CM

## 2025-09-04 ENCOUNTER — OFFICE VISIT (OUTPATIENT)
Dept: PRIMARY CARE | Facility: CLINIC | Age: 55
End: 2025-09-04
Payer: COMMERCIAL

## 2025-09-04 VITALS
HEART RATE: 86 BPM | TEMPERATURE: 97.6 F | BODY MASS INDEX: 23.01 KG/M2 | DIASTOLIC BLOOD PRESSURE: 84 MMHG | SYSTOLIC BLOOD PRESSURE: 124 MMHG | WEIGHT: 155.8 LBS | OXYGEN SATURATION: 97 %

## 2025-09-04 DIAGNOSIS — G56.01 CARPAL TUNNEL SYNDROME, RIGHT UPPER LIMB: ICD-10-CM

## 2025-09-04 DIAGNOSIS — N30.90 CYSTITIS: Primary | ICD-10-CM

## 2025-09-04 LAB
POC APPEARANCE, URINE: CLEAR
POC BILIRUBIN, URINE: NEGATIVE
POC BLOOD, URINE: ABNORMAL
POC COLOR, URINE: YELLOW
POC GLUCOSE, URINE: NEGATIVE MG/DL
POC KETONES, URINE: NEGATIVE MG/DL
POC LEUKOCYTES, URINE: ABNORMAL
POC NITRITE,URINE: NEGATIVE
POC PH, URINE: 6.5 PH
POC PROTEIN, URINE: ABNORMAL MG/DL
POC SPECIFIC GRAVITY, URINE: 1.01
POC UROBILINOGEN, URINE: 0.2 EU/DL

## 2025-09-04 PROCEDURE — 1036F TOBACCO NON-USER: CPT | Performed by: INTERNAL MEDICINE

## 2025-09-04 PROCEDURE — 99214 OFFICE O/P EST MOD 30 MIN: CPT | Performed by: INTERNAL MEDICINE

## 2025-09-04 PROCEDURE — 81002 URINALYSIS NONAUTO W/O SCOPE: CPT | Performed by: INTERNAL MEDICINE

## 2025-09-04 RX ORDER — MELOXICAM 15 MG/1
15 TABLET ORAL DAILY
Qty: 30 TABLET | Refills: 1 | Status: SHIPPED | OUTPATIENT
Start: 2025-09-04

## 2025-09-04 RX ORDER — CIPROFLOXACIN 500 MG/1
500 TABLET, FILM COATED ORAL 2 TIMES DAILY
Qty: 14 TABLET | Refills: 0 | Status: SHIPPED | OUTPATIENT
Start: 2025-09-04 | End: 2025-09-11

## 2025-09-04 ASSESSMENT — PATIENT HEALTH QUESTIONNAIRE - PHQ9
2. FEELING DOWN, DEPRESSED OR HOPELESS: NOT AT ALL
SUM OF ALL RESPONSES TO PHQ9 QUESTIONS 1 AND 2: 0
1. LITTLE INTEREST OR PLEASURE IN DOING THINGS: NOT AT ALL

## (undated) DEVICE — Device

## (undated) DEVICE — TOWEL PACK, STERILE, 16X24, XRAY DETECTABLE, BLUE, 4/PK

## (undated) DEVICE — DRESSING, NON-ADHERENT, 3 X 3 IN, STERILE

## (undated) DEVICE — STRIP, SKIN CLOSURE, STERI STRIP, REINFORCED, 0.5 X 4 IN

## (undated) DEVICE — SUTURE, ETHILON, 4-0, BLK, MONO, PS-2 18

## (undated) DEVICE — GOWN, SURGICAL, ROYAL SILK, XL, STERILE

## (undated) DEVICE — PUMP, STRYKERFLOW 2 & HANDPIECE W/10FT. IRRIGATION TUBING

## (undated) DEVICE — SUTURE, VICRYL, 0, 27 IN, UR-6, VIOLET

## (undated) DEVICE — DRAPE, SHEET, LAPAROTOMY, W/ISO-BAC, W/ARMBOARD COVERS, 98 X 122 IN, DISPOSABLE, LF, STERILE

## (undated) DEVICE — SYSTEM, TROCAR LAP, 5X100MM, SHIELD BLADED, KII ADVANCED FIX ABDOMINAL

## (undated) DEVICE — BALLOON, PREPERITONEAL, DISSECTOR, KIDNEY, SPACEMAKER

## (undated) DEVICE — WARMER, DUAL SCOPE

## (undated) DEVICE — GOWN, SURGICAL, ROYAL SILK, LG, STERILE

## (undated) DEVICE — ELECTRODE, ELECTROSURGICAL, LAPAROSCOPIC, L HOOK TIP, 36 IN

## (undated) DEVICE — SHEAR, W/UNIPOLAR CAUTERY, ENDOSHEAR, 5 MM

## (undated) DEVICE — PROTECTOR, NERVE, ULNAR, PINK

## (undated) DEVICE — BANDAGE, COHESIVE, HAND TEAR, COFLEX, 2 X 5 YDS, LF

## (undated) DEVICE — BANDAGE, SELF ADHESIVE, TENSOPLAST, 1 IN X 5 YD, WHITE

## (undated) DEVICE — GLOVE, SURGICAL, PROTEXIS PI , 7.0, PF, LF

## (undated) DEVICE — GLOVE, SURGICAL, PROTEXIS PI , 6.0, PF, LF

## (undated) DEVICE — MARKER, SKIN, W/ RULER, LF, STERILE

## (undated) DEVICE — PADDING, CAST, SPECIALIST, 3 IN X 4 YD, STERILE

## (undated) DEVICE — SOLUTION KIT, ANTIFOG, 6CC, LF

## (undated) DEVICE — SYRINGE, 60 CC, IRRIGATION, BULB, CONTRO-BULB, PAPER POUCH

## (undated) DEVICE — SYRINGE, 10 CC, SLIP TIP

## (undated) DEVICE — TROCAR SYSTEM, BALLOON, KII GELPORT, 12 X 100MM

## (undated) DEVICE — SYRINGE, 10 CC, LUER LOCK

## (undated) DEVICE — DRAPE, SHEET, XL

## (undated) DEVICE — STRAP, VELCRO, BODY, 4 X 60IN, NS

## (undated) DEVICE — SUTURE, ETHILON, 3-0, 18 IN, PS1, BLACK

## (undated) DEVICE — APPLICATOR, CHLORAPREP, W/ORANGE TINT, 26ML

## (undated) DEVICE — BANDAGE, ADHESIVE, FLEXIBLE FABRIC, 1 X 3

## (undated) DEVICE — FIXATION DEVICE, 20 TRACKER, SINGLE USE, LF

## (undated) DEVICE — SUTURE, VICRYL, 4-0, 18 IN, PS2, UNDYED

## (undated) DEVICE — ELECTRODE, ELECTROSURGICAL, BLADE, INSULATED, ENT/IMA, STERILE

## (undated) DEVICE — CAUTERY, PENCIL, PUSH BUTTON, SMOKE EVAC, 70MM

## (undated) DEVICE — SUTURE, MONOCRYL, 4-0, 27 IN, PS-2, UNDYED

## (undated) DEVICE — TRAY, SURESTEP, SILICONE DRAINAGE BAG, STATLOCK, 16FR

## (undated) DEVICE — MANIFOLD, 4 PORT NEPTUNE STANDARD

## (undated) DEVICE — GLOVE, SURGICAL, PROTEXIS PI , 7.5, PF, LF

## (undated) DEVICE — NEEDLE, SAFETY, 25 GA X 1.5 IN

## (undated) DEVICE — STRAP, ARM BOARD, 32 X 1.5

## (undated) DEVICE — STRIP, SKIN CLOSURE, COMPOUND BENZION TINCTURE 0.6ML

## (undated) DEVICE — TOWELS 4-PK

## (undated) DEVICE — SUTURE, VICRYL, 3-0, 27 IN, SH-1, VIOLET

## (undated) DEVICE — GLOVE, SURGICAL, PROTEXIS PI BLUE W/NEUTHERA, 6.5, PF, LF

## (undated) DEVICE — HOLSTER, JET SAFETY